# Patient Record
Sex: MALE | Race: WHITE | Employment: OTHER | URBAN - METROPOLITAN AREA
[De-identification: names, ages, dates, MRNs, and addresses within clinical notes are randomized per-mention and may not be internally consistent; named-entity substitution may affect disease eponyms.]

---

## 2018-05-18 ENCOUNTER — OFFICE VISIT CONVERTED (OUTPATIENT)
Dept: FAMILY MEDICINE CLINIC | Facility: CLINIC | Age: 63
End: 2018-05-18
Attending: NURSE PRACTITIONER

## 2018-11-03 ENCOUNTER — OFFICE VISIT CONVERTED (OUTPATIENT)
Dept: FAMILY MEDICINE CLINIC | Facility: CLINIC | Age: 63
End: 2018-11-03
Attending: FAMILY MEDICINE

## 2019-01-22 ENCOUNTER — OFFICE VISIT CONVERTED (OUTPATIENT)
Dept: FAMILY MEDICINE CLINIC | Facility: CLINIC | Age: 64
End: 2019-01-22
Attending: FAMILY MEDICINE

## 2019-02-18 ENCOUNTER — OFFICE (OUTPATIENT)
Dept: URBAN - METROPOLITAN AREA CLINIC 42 | Facility: CLINIC | Age: 64
End: 2019-02-18

## 2019-02-18 VITALS
HEIGHT: 68 IN | SYSTOLIC BLOOD PRESSURE: 97 MMHG | WEIGHT: 164 LBS | DIASTOLIC BLOOD PRESSURE: 65 MMHG | HEART RATE: 83 BPM

## 2019-02-18 DIAGNOSIS — R12 HEARTBURN: ICD-10-CM

## 2019-02-18 DIAGNOSIS — R13.10 DYSPHAGIA, UNSPECIFIED: ICD-10-CM

## 2019-02-18 DIAGNOSIS — Z12.11 ENCOUNTER FOR SCREENING FOR MALIGNANT NEOPLASM OF COLON: ICD-10-CM

## 2019-02-18 PROCEDURE — 99204 OFFICE O/P NEW MOD 45 MIN: CPT

## 2019-03-05 ENCOUNTER — AMBULATORY SURGICAL CENTER (OUTPATIENT)
Dept: URBAN - METROPOLITAN AREA SURGERY 20 | Facility: SURGERY | Age: 64
End: 2019-03-05
Payer: COMMERCIAL

## 2019-03-05 ENCOUNTER — OFFICE (OUTPATIENT)
Dept: URBAN - METROPOLITAN AREA PATHOLOGY 4 | Facility: PATHOLOGY | Age: 64
End: 2019-03-05

## 2019-03-05 VITALS — HEIGHT: 68 IN

## 2019-03-05 DIAGNOSIS — D12.3 BENIGN NEOPLASM OF TRANSVERSE COLON: ICD-10-CM

## 2019-03-05 DIAGNOSIS — D12.0 BENIGN NEOPLASM OF CECUM: ICD-10-CM

## 2019-03-05 DIAGNOSIS — K31.89 OTHER DISEASES OF STOMACH AND DUODENUM: ICD-10-CM

## 2019-03-05 DIAGNOSIS — R12 HEARTBURN: ICD-10-CM

## 2019-03-05 DIAGNOSIS — K21.0 GASTRO-ESOPHAGEAL REFLUX DISEASE WITH ESOPHAGITIS: ICD-10-CM

## 2019-03-05 DIAGNOSIS — R13.10 DYSPHAGIA, UNSPECIFIED: ICD-10-CM

## 2019-03-05 DIAGNOSIS — Z12.11 ENCOUNTER FOR SCREENING FOR MALIGNANT NEOPLASM OF COLON: ICD-10-CM

## 2019-03-05 PROBLEM — K92.2 GASTROINTESTINAL HEMORRHAGE, UNSPECIFIED: Status: ACTIVE | Noted: 2019-03-05

## 2019-03-05 PROBLEM — K20.9 ESOPHAGITIS, UNSPECIFIED: Status: ACTIVE | Noted: 2019-03-05

## 2019-03-05 PROBLEM — K31.7 POLYP OF STOMACH AND DUODENUM: Status: ACTIVE | Noted: 2019-03-05

## 2019-03-05 LAB
GI HISTOLOGY: A. SELECT: (no result)
GI HISTOLOGY: B. SELECT: (no result)
GI HISTOLOGY: C. SELECT: (no result)
GI HISTOLOGY: D. SELECT: (no result)
GI HISTOLOGY: PDF REPORT: (no result)

## 2019-03-05 PROCEDURE — 43251 EGD REMOVE LESION SNARE: CPT

## 2019-03-05 PROCEDURE — 45385 COLONOSCOPY W/LESION REMOVAL: CPT | Mod: PT

## 2019-03-05 PROCEDURE — 88305 TISSUE EXAM BY PATHOLOGIST: CPT

## 2019-03-05 PROCEDURE — 43239 EGD BIOPSY SINGLE/MULTIPLE: CPT | Mod: 59

## 2019-03-05 RX ORDER — OMEPRAZOLE 20 MG/1
40 CAPSULE, DELAYED RELEASE ORAL
Qty: 180 | Refills: 3 | Status: ACTIVE
Start: 2019-03-05

## 2019-10-26 ENCOUNTER — OFFICE VISIT CONVERTED (OUTPATIENT)
Dept: FAMILY MEDICINE CLINIC | Facility: CLINIC | Age: 64
End: 2019-10-26
Attending: NURSE PRACTITIONER

## 2019-12-10 ENCOUNTER — HOSPITAL ENCOUNTER (OUTPATIENT)
Dept: FAMILY MEDICINE CLINIC | Facility: CLINIC | Age: 64
Discharge: HOME | End: 2019-12-10
Attending: NURSE PRACTITIONER

## 2019-12-10 ENCOUNTER — OFFICE VISIT CONVERTED (OUTPATIENT)
Dept: FAMILY MEDICINE CLINIC | Facility: CLINIC | Age: 64
End: 2019-12-10
Attending: NURSE PRACTITIONER

## 2019-12-10 LAB
CHOLEST SERPL-MCNC: 205 MG/DL (ref 107–200)
CHOLEST/HDLC SERPL: 4.1 {RATIO} (ref 3–6)
HDLC SERPL-MCNC: 50 MG/DL (ref 40–60)
LDLC SERPL CALC-MCNC: 137 MG/DL (ref 70–100)
PSA SERPL-MCNC: 3.68 NG/ML (ref 0–4)
TRIGL SERPL-MCNC: 92 MG/DL (ref 40–150)
VLDLC SERPL-MCNC: 18 MG/DL (ref 5–37)

## 2019-12-11 LAB — HCV AB S/CO SERPL IA: <0.1 S/CO RATIO (ref 0–0.9)

## 2019-12-13 LAB — CONV HIV COMBO AG/AB (HIV-1/O/2) WITH REFLEX: NEGATIVE

## 2019-12-31 ENCOUNTER — HOSPITAL ENCOUNTER (OUTPATIENT)
Dept: OTHER | Facility: HOSPITAL | Age: 64
Discharge: HOME | End: 2019-12-31
Attending: NURSE PRACTITIONER

## 2020-03-16 ENCOUNTER — HOSPITAL ENCOUNTER (OUTPATIENT)
Dept: FAMILY MEDICINE CLINIC | Facility: CLINIC | Age: 65
Discharge: HOME | End: 2020-03-16
Attending: FAMILY MEDICINE

## 2020-03-16 ENCOUNTER — HOSPITAL ENCOUNTER (OUTPATIENT)
Dept: CARDIOLOGY | Facility: HOSPITAL | Age: 65
Discharge: HOME | End: 2020-03-16
Attending: FAMILY MEDICINE

## 2020-03-16 ENCOUNTER — OFFICE VISIT CONVERTED (OUTPATIENT)
Dept: FAMILY MEDICINE CLINIC | Facility: CLINIC | Age: 65
End: 2020-03-16
Attending: FAMILY MEDICINE

## 2020-05-18 ENCOUNTER — HOSPITAL ENCOUNTER (OUTPATIENT)
Dept: FAMILY MEDICINE CLINIC | Facility: CLINIC | Age: 65
Discharge: HOME | End: 2020-05-18
Attending: NURSE PRACTITIONER

## 2020-05-18 ENCOUNTER — OFFICE VISIT CONVERTED (OUTPATIENT)
Dept: FAMILY MEDICINE CLINIC | Facility: CLINIC | Age: 65
End: 2020-05-18
Attending: NURSE PRACTITIONER

## 2020-05-21 LAB
ASO AB SERPL-ACNC: 66 [IU]/ML (ref 0–200)
CONV RHEUMATOID FACTOR IGM: <10 [IU]/ML (ref 0–14)
CRP SERPL-MCNC: 2 MG/L (ref 0–5)
DSDNA AB SER-ACNC: NEGATIVE [IU]/ML
ENA AB SER IA-ACNC: NEGATIVE {RATIO}
URATE SERPL-MCNC: 6.6 MG/DL (ref 3.5–8.5)

## 2020-09-17 ENCOUNTER — OFFICE VISIT CONVERTED (OUTPATIENT)
Dept: FAMILY MEDICINE CLINIC | Facility: CLINIC | Age: 65
End: 2020-09-17
Attending: FAMILY MEDICINE

## 2020-09-17 ENCOUNTER — CONVERSION ENCOUNTER (OUTPATIENT)
Dept: FAMILY MEDICINE CLINIC | Facility: CLINIC | Age: 65
End: 2020-09-17

## 2020-09-17 ENCOUNTER — HOSPITAL ENCOUNTER (OUTPATIENT)
Dept: FAMILY MEDICINE CLINIC | Facility: CLINIC | Age: 65
Discharge: HOME | End: 2020-09-17
Attending: FAMILY MEDICINE

## 2020-09-19 LAB
BACTERIA SPEC AEROBE CULT: NORMAL
SARS-COV-2 RNA SPEC QL NAA+PROBE: NOT DETECTED

## 2020-09-25 ENCOUNTER — OFFICE VISIT CONVERTED (OUTPATIENT)
Dept: FAMILY MEDICINE CLINIC | Facility: CLINIC | Age: 65
End: 2020-09-25
Attending: FAMILY MEDICINE

## 2021-05-09 VITALS
DIASTOLIC BLOOD PRESSURE: 66 MMHG | TEMPERATURE: 97.8 F | HEIGHT: 67 IN | WEIGHT: 158.25 LBS | SYSTOLIC BLOOD PRESSURE: 122 MMHG | BODY MASS INDEX: 24.84 KG/M2 | OXYGEN SATURATION: 95 % | HEART RATE: 70 BPM

## 2021-05-09 VITALS
HEART RATE: 65 BPM | TEMPERATURE: 97 F | WEIGHT: 159 LBS | SYSTOLIC BLOOD PRESSURE: 120 MMHG | DIASTOLIC BLOOD PRESSURE: 84 MMHG | OXYGEN SATURATION: 97 % | HEIGHT: 67 IN | BODY MASS INDEX: 24.96 KG/M2

## 2021-05-09 VITALS — HEART RATE: 95 BPM | TEMPERATURE: 98.2 F | OXYGEN SATURATION: 96 %

## 2021-05-09 VITALS
HEART RATE: 73 BPM | SYSTOLIC BLOOD PRESSURE: 90 MMHG | DIASTOLIC BLOOD PRESSURE: 62 MMHG | BODY MASS INDEX: 24.82 KG/M2 | HEIGHT: 67 IN | OXYGEN SATURATION: 97 % | TEMPERATURE: 98.3 F | WEIGHT: 158.12 LBS

## 2021-05-09 VITALS
SYSTOLIC BLOOD PRESSURE: 122 MMHG | HEART RATE: 73 BPM | WEIGHT: 162 LBS | TEMPERATURE: 97.4 F | OXYGEN SATURATION: 98 % | DIASTOLIC BLOOD PRESSURE: 74 MMHG

## 2021-05-09 VITALS — TEMPERATURE: 98.2 F | OXYGEN SATURATION: 94 % | HEART RATE: 62 BPM

## 2021-05-09 VITALS
SYSTOLIC BLOOD PRESSURE: 122 MMHG | OXYGEN SATURATION: 96 % | HEART RATE: 88 BPM | TEMPERATURE: 98.3 F | DIASTOLIC BLOOD PRESSURE: 70 MMHG | HEIGHT: 67 IN | BODY MASS INDEX: 24.84 KG/M2 | WEIGHT: 158.25 LBS

## 2021-05-09 VITALS
HEIGHT: 67 IN | TEMPERATURE: 97.4 F | SYSTOLIC BLOOD PRESSURE: 110 MMHG | WEIGHT: 155 LBS | DIASTOLIC BLOOD PRESSURE: 70 MMHG | BODY MASS INDEX: 24.33 KG/M2 | HEART RATE: 83 BPM | OXYGEN SATURATION: 93 %

## 2021-05-09 VITALS
HEART RATE: 97 BPM | SYSTOLIC BLOOD PRESSURE: 120 MMHG | BODY MASS INDEX: 26.2 KG/M2 | DIASTOLIC BLOOD PRESSURE: 80 MMHG | HEIGHT: 66 IN | OXYGEN SATURATION: 93 % | TEMPERATURE: 97.6 F | WEIGHT: 163 LBS

## 2021-06-15 ENCOUNTER — TELEPHONE (OUTPATIENT)
Dept: FAMILY MEDICINE CLINIC | Facility: CLINIC | Age: 66
End: 2021-06-15

## 2021-06-15 DIAGNOSIS — N40.1 BPH ASSOCIATED WITH NOCTURIA: Primary | ICD-10-CM

## 2021-06-15 DIAGNOSIS — R35.1 BPH ASSOCIATED WITH NOCTURIA: Primary | ICD-10-CM

## 2021-06-15 RX ORDER — TAMSULOSIN HYDROCHLORIDE 0.4 MG/1
1 CAPSULE ORAL DAILY
Qty: 30 CAPSULE | Refills: 0 | Status: SHIPPED | OUTPATIENT
Start: 2021-06-15 | End: 2021-07-19

## 2021-06-15 NOTE — TELEPHONE ENCOUNTER
Caller: RiceGuillaume    Relationship: Self    Best call back number: [phone]    Medication needed:   Requested Prescriptions      No prescriptions requested or ordered in this encounter      TAMSULOSIN 0.4 MG     When do you need the refill by: 06/15/2021    What additional details did the patient provide when requesting the medication: PATIENT WOULD LIKE A 90 DAY SUPPLY.    Does the patient have less than a 3 day supply:  [x] Yes  [] No    What is the patient's preferred pharmacy: [address] [phone] [facility] [phone]

## 2021-06-25 ENCOUNTER — OFFICE VISIT (OUTPATIENT)
Dept: FAMILY MEDICINE CLINIC | Facility: CLINIC | Age: 66
End: 2021-06-25

## 2021-06-25 VITALS
SYSTOLIC BLOOD PRESSURE: 110 MMHG | HEART RATE: 54 BPM | BODY MASS INDEX: 25.3 KG/M2 | HEIGHT: 67 IN | OXYGEN SATURATION: 94 % | DIASTOLIC BLOOD PRESSURE: 52 MMHG | TEMPERATURE: 97.5 F | WEIGHT: 161.2 LBS

## 2021-06-25 DIAGNOSIS — E78.2 MIXED HYPERLIPIDEMIA: Primary | ICD-10-CM

## 2021-06-25 DIAGNOSIS — Z12.5 SCREENING PSA (PROSTATE SPECIFIC ANTIGEN): ICD-10-CM

## 2021-06-25 PROBLEM — I10 HYPERTENSION: Status: ACTIVE | Noted: 2021-06-25

## 2021-06-25 PROBLEM — G47.30 SLEEP APNEA: Status: ACTIVE | Noted: 2021-06-25

## 2021-06-25 PROBLEM — N40.0 BPH (BENIGN PROSTATIC HYPERPLASIA): Status: ACTIVE | Noted: 2021-06-25

## 2021-06-25 PROBLEM — E78.5 HYPERLIPIDEMIA: Status: ACTIVE | Noted: 2021-06-25

## 2021-06-25 PROBLEM — H26.9 CATARACT: Status: ACTIVE | Noted: 2021-06-25

## 2021-06-25 LAB
ALBUMIN SERPL-MCNC: 4.3 G/DL (ref 3.5–5.2)
ALBUMIN/GLOB SERPL: 1.6 G/DL
ALP SERPL-CCNC: 57 U/L (ref 39–117)
ALT SERPL W P-5'-P-CCNC: 26 U/L (ref 1–41)
ANION GAP SERPL CALCULATED.3IONS-SCNC: 8.6 MMOL/L (ref 5–15)
AST SERPL-CCNC: 18 U/L (ref 1–40)
BASOPHILS # BLD AUTO: 0.11 10*3/MM3 (ref 0–0.2)
BASOPHILS NFR BLD AUTO: 1.4 % (ref 0–1.5)
BILIRUB SERPL-MCNC: 0.3 MG/DL (ref 0–1.2)
BUN SERPL-MCNC: 17 MG/DL (ref 8–23)
BUN/CREAT SERPL: 15 (ref 7–25)
CALCIUM SPEC-SCNC: 9.4 MG/DL (ref 8.6–10.5)
CHLORIDE SERPL-SCNC: 104 MMOL/L (ref 98–107)
CHOLEST SERPL-MCNC: 186 MG/DL (ref 0–200)
CO2 SERPL-SCNC: 24.4 MMOL/L (ref 22–29)
CREAT SERPL-MCNC: 1.13 MG/DL (ref 0.76–1.27)
DEPRECATED RDW RBC AUTO: 46.2 FL (ref 37–54)
EOSINOPHIL # BLD AUTO: 0.28 10*3/MM3 (ref 0–0.4)
EOSINOPHIL NFR BLD AUTO: 3.5 % (ref 0.3–6.2)
ERYTHROCYTE [DISTWIDTH] IN BLOOD BY AUTOMATED COUNT: 13.8 % (ref 12.3–15.4)
GFR SERPL CREATININE-BSD FRML MDRD: 65 ML/MIN/1.73
GLOBULIN UR ELPH-MCNC: 2.7 GM/DL
GLUCOSE SERPL-MCNC: 88 MG/DL (ref 65–99)
HCT VFR BLD AUTO: 49.7 % (ref 37.5–51)
HDLC SERPL-MCNC: 51 MG/DL (ref 40–60)
HGB BLD-MCNC: 16.5 G/DL (ref 13–17.7)
IMM GRANULOCYTES # BLD AUTO: 0.03 10*3/MM3 (ref 0–0.05)
IMM GRANULOCYTES NFR BLD AUTO: 0.4 % (ref 0–0.5)
LDLC SERPL CALC-MCNC: 121 MG/DL (ref 0–100)
LDLC/HDLC SERPL: 2.35 {RATIO}
LYMPHOCYTES # BLD AUTO: 2.86 10*3/MM3 (ref 0.7–3.1)
LYMPHOCYTES NFR BLD AUTO: 35.5 % (ref 19.6–45.3)
MCH RBC QN AUTO: 30.6 PG (ref 26.6–33)
MCHC RBC AUTO-ENTMCNC: 33.2 G/DL (ref 31.5–35.7)
MCV RBC AUTO: 92.2 FL (ref 79–97)
MONOCYTES # BLD AUTO: 0.46 10*3/MM3 (ref 0.1–0.9)
MONOCYTES NFR BLD AUTO: 5.7 % (ref 5–12)
NEUTROPHILS NFR BLD AUTO: 4.31 10*3/MM3 (ref 1.7–7)
NEUTROPHILS NFR BLD AUTO: 53.5 % (ref 42.7–76)
NRBC BLD AUTO-RTO: 0 /100 WBC (ref 0–0.2)
PLATELET # BLD AUTO: 276 10*3/MM3 (ref 140–450)
PMV BLD AUTO: 11.5 FL (ref 6–12)
POTASSIUM SERPL-SCNC: 4.7 MMOL/L (ref 3.5–5.2)
PROT SERPL-MCNC: 7 G/DL (ref 6–8.5)
PSA SERPL-MCNC: 3.17 NG/ML (ref 0–4)
RBC # BLD AUTO: 5.39 10*6/MM3 (ref 4.14–5.8)
SODIUM SERPL-SCNC: 137 MMOL/L (ref 136–145)
TRIGL SERPL-MCNC: 75 MG/DL (ref 0–150)
VLDLC SERPL-MCNC: 14 MG/DL (ref 5–40)
WBC # BLD AUTO: 8.05 10*3/MM3 (ref 3.4–10.8)

## 2021-06-25 PROCEDURE — 85025 COMPLETE CBC W/AUTO DIFF WBC: CPT | Performed by: FAMILY MEDICINE

## 2021-06-25 PROCEDURE — G0439 PPPS, SUBSEQ VISIT: HCPCS | Performed by: FAMILY MEDICINE

## 2021-06-25 PROCEDURE — 80053 COMPREHEN METABOLIC PANEL: CPT | Performed by: FAMILY MEDICINE

## 2021-06-25 PROCEDURE — 1170F FXNL STATUS ASSESSED: CPT | Performed by: FAMILY MEDICINE

## 2021-06-25 PROCEDURE — 1159F MED LIST DOCD IN RCRD: CPT | Performed by: FAMILY MEDICINE

## 2021-06-25 PROCEDURE — G0103 PSA SCREENING: HCPCS | Performed by: FAMILY MEDICINE

## 2021-06-25 PROCEDURE — 80061 LIPID PANEL: CPT | Performed by: FAMILY MEDICINE

## 2021-06-25 PROCEDURE — 36415 COLL VENOUS BLD VENIPUNCTURE: CPT | Performed by: FAMILY MEDICINE

## 2021-06-25 NOTE — PROGRESS NOTES
The ABCs of the Annual Wellness Visit  Subsequent Medicare Wellness Visit    Chief Complaint   Patient presents with   • Medicare Wellness-subsequent       Subjective   History of Present Illness:  Guillaume Rice is a 66 y.o. male who presents for a Subsequent Medicare Wellness Visit.    HEALTH RISK ASSESSMENT    Recent Hospitalizations:  No hospitalization(s) within the last year.    Current Medical Providers:  Patient Care Team:  Foreign Nieves MD as PCP - General (Family Medicine)    Smoking Status:  Social History     Tobacco Use   Smoking Status Former Smoker   • Quit date: 2012   • Years since quittin.9   Smokeless Tobacco Never Used   Tobacco Comment    1 to 2 ppd        Alcohol Consumption:  Social History     Substance and Sexual Activity   Alcohol Use Yes    Comment: 12 pack beer weekly       Depression Screen:   PHQ-2/PHQ-9 Depression Screening 2021   Little interest or pleasure in doing things 0   Feeling down, depressed, or hopeless 0   Total Score 0       Fall Risk Screen:  CHRIS Fall Risk Assessment was completed, and patient is at LOW risk for falls.Assessment completed on:2021    Health Habits and Functional and Cognitive Screening:  Functional & Cognitive Status 2021   Do you have difficulty preparing food and eating? -   Do you have difficulty bathing yourself, getting dressed or grooming yourself? -   Do you have difficulty using the toilet? No   Do you have difficulty moving around from place to place? No   Do you have trouble with steps or getting out of a bed or a chair? No   Current Diet Limited Junk Food   Dental Exam Up to date   Eye Exam Up to date   Exercise (times per week) 7 times per week   Current Exercises Include Walking   Do you need help using the phone?  No   Are you deaf or do you have serious difficulty hearing?  No   Do you need help with transportation? No   Do you need help shopping? No   Do you need help preparing meals?  No   Do you need  help with housework?  No   Do you need help with laundry? No   Do you need help taking your medications? No   Do you need help managing money? No   Do you ever drive or ride in a car without wearing a seat belt? No   Have you felt unusual stress, anger or loneliness in the last month? No   Who do you live with? Spouse   If you need help, do you have trouble finding someone available to you? No   Have you been bothered in the last four weeks by sexual problems? No   Do you have difficulty concentrating, remembering or making decisions? Yes         Does the patient have evidence of cognitive impairment? No    Asprin use counseling:Start ASA 81 mg daily     Age-appropriate Screening Schedule:  Refer to the list below for future screening recommendations based on patient's age, sex and/or medical conditions. Orders for these recommended tests are listed in the plan section. The patient has been provided with a written plan.    Health Maintenance   Topic Date Due   • TDAP/TD VACCINES (1 - Tdap) Never done   • ZOSTER VACCINE (1 of 2) Never done   • LIPID PANEL  06/16/2021   • INFLUENZA VACCINE  08/01/2021          The following portions of the patient's history were reviewed and updated as appropriate: allergies, current medications, past family history, past medical history, past social history, past surgical history and problem list.    Outpatient Medications Prior to Visit   Medication Sig Dispense Refill   • tamsulosin (FLOMAX) 0.4 MG capsule 24 hr capsule Take 1 capsule by mouth Daily. 30 capsule 0     No facility-administered medications prior to visit.       Patient Active Problem List   Diagnosis   • BPH (benign prostatic hyperplasia)   • Cataract   • Hyperlipidemia   • Hypertension   • Sleep apnea       Advanced Care Planning:  ACP discussion was held with the patient during this visit. Patient does not have an advance directive, information provided.    Review of Systems   Constitutional: Negative for activity  "change, appetite change, chills and fever.   Respiratory: Negative for cough, chest tightness and shortness of breath.    Cardiovascular: Negative for chest pain, palpitations and leg swelling.   Gastrointestinal: Negative for diarrhea, nausea and vomiting.   Musculoskeletal: Negative for arthralgias.   Skin: Negative for rash.   Neurological: Negative for dizziness.   Psychiatric/Behavioral: Negative for agitation, confusion, dysphoric mood, sleep disturbance and suicidal ideas. The patient is not nervous/anxious.        Compared to one year ago, the patient feels his physical health is the same.  Compared to one year ago, the patient feels his mental health is the same.    Reviewed chart for potential of high risk medication in the elderly: yes  Reviewed chart for potential of harmful drug interactions in the elderly:yes    Objective         Vitals:    06/25/21 0925   BP: 110/52   Pulse: 54   Temp: 97.5 °F (36.4 °C)   SpO2: 94%   Weight: 73.1 kg (161 lb 3.2 oz)   Height: 170.8 cm (67.25\")       Body mass index is 25.06 kg/m².  Discussed the patient's BMI with him. The BMI is in the acceptable range.    Physical Exam  Vitals reviewed.   Constitutional:       Appearance: Normal appearance. He is well-developed.   HENT:      Head: Normocephalic and atraumatic.      Right Ear: External ear normal.      Left Ear: External ear normal.      Mouth/Throat:      Pharynx: No oropharyngeal exudate.   Eyes:      Conjunctiva/sclera: Conjunctivae normal.      Pupils: Pupils are equal, round, and reactive to light.   Cardiovascular:      Rate and Rhythm: Normal rate and regular rhythm.      Pulses: Normal pulses.      Heart sounds: Normal heart sounds. No murmur heard.   No friction rub. No gallop.    Pulmonary:      Effort: Pulmonary effort is normal.      Breath sounds: Normal breath sounds. No wheezing or rhonchi.   Abdominal:      General: Bowel sounds are normal. There is no distension.      Palpations: Abdomen is soft.      " Tenderness: There is no abdominal tenderness.   Skin:     General: Skin is warm and dry.   Neurological:      Mental Status: He is alert and oriented to person, place, and time.      Cranial Nerves: No cranial nerve deficit.   Psychiatric:         Mood and Affect: Mood and affect normal.         Behavior: Behavior normal.         Thought Content: Thought content normal.         Judgment: Judgment normal.               Assessment/Plan   Medicare Risks and Personalized Health Plan  CMS Preventative Services Quick Reference  Advance Directive Discussion  Cardiovascular risk    The above risks/problems have been discussed with the patient.  Pertinent information has been shared with the patient in the After Visit Summary.  Follow up plans and orders are seen below in the Assessment/Plan Section.    Diagnoses and all orders for this visit:    1. Mixed hyperlipidemia (Primary)  -     CBC Auto Differential  -     Comprehensive Metabolic Panel  -     Lipid Panel    2. Screening PSA (prostate specific antigen)  -     PSA Screen      Follow Up:  Return in about 6 months (around 12/25/2021) for Recheck.     An After Visit Summary and PPPS were given to the patient.

## 2021-06-25 NOTE — PROGRESS NOTES
Venipuncture Blood Specimen Collection  Venipuncture performed in left arm by Carmen Johnson with good hemostasis. Patient tolerated the procedure well without complications.   06/25/21   Carmen Johnson

## 2021-06-28 NOTE — PROGRESS NOTES
Labs show no significant abnormalities except for elevated cholesterol that is better than last time.  Continue to watch diet and exercise.  Follow-up if you have any questions.

## 2021-07-12 DIAGNOSIS — N40.1 BPH ASSOCIATED WITH NOCTURIA: ICD-10-CM

## 2021-07-12 DIAGNOSIS — R35.1 BPH ASSOCIATED WITH NOCTURIA: ICD-10-CM

## 2021-07-19 RX ORDER — TAMSULOSIN HYDROCHLORIDE 0.4 MG/1
CAPSULE ORAL
Qty: 30 CAPSULE | Refills: 0 | Status: SHIPPED | OUTPATIENT
Start: 2021-07-19 | End: 2021-08-23

## 2021-08-21 DIAGNOSIS — N40.1 BPH ASSOCIATED WITH NOCTURIA: ICD-10-CM

## 2021-08-21 DIAGNOSIS — R35.1 BPH ASSOCIATED WITH NOCTURIA: ICD-10-CM

## 2021-08-23 RX ORDER — TAMSULOSIN HYDROCHLORIDE 0.4 MG/1
CAPSULE ORAL
Qty: 30 CAPSULE | Refills: 0 | Status: SHIPPED | OUTPATIENT
Start: 2021-08-23 | End: 2021-09-21

## 2021-09-21 DIAGNOSIS — R35.1 BPH ASSOCIATED WITH NOCTURIA: ICD-10-CM

## 2021-09-21 DIAGNOSIS — N40.1 BPH ASSOCIATED WITH NOCTURIA: ICD-10-CM

## 2021-09-21 RX ORDER — TAMSULOSIN HYDROCHLORIDE 0.4 MG/1
CAPSULE ORAL
Qty: 30 CAPSULE | Refills: 0 | Status: SHIPPED | OUTPATIENT
Start: 2021-09-21 | End: 2021-10-21

## 2021-10-20 DIAGNOSIS — N40.1 BPH ASSOCIATED WITH NOCTURIA: ICD-10-CM

## 2021-10-20 DIAGNOSIS — R35.1 BPH ASSOCIATED WITH NOCTURIA: ICD-10-CM

## 2021-10-21 RX ORDER — TAMSULOSIN HYDROCHLORIDE 0.4 MG/1
CAPSULE ORAL
Qty: 30 CAPSULE | Refills: 0 | Status: SHIPPED | OUTPATIENT
Start: 2021-10-21 | End: 2021-11-19

## 2021-11-19 DIAGNOSIS — R35.1 BPH ASSOCIATED WITH NOCTURIA: ICD-10-CM

## 2021-11-19 DIAGNOSIS — N40.1 BPH ASSOCIATED WITH NOCTURIA: ICD-10-CM

## 2021-11-19 RX ORDER — TAMSULOSIN HYDROCHLORIDE 0.4 MG/1
CAPSULE ORAL
Qty: 30 CAPSULE | Refills: 0 | Status: SHIPPED | OUTPATIENT
Start: 2021-11-19 | End: 2021-12-30

## 2021-12-29 DIAGNOSIS — R35.1 BPH ASSOCIATED WITH NOCTURIA: ICD-10-CM

## 2021-12-29 DIAGNOSIS — N40.1 BPH ASSOCIATED WITH NOCTURIA: ICD-10-CM

## 2021-12-30 RX ORDER — TAMSULOSIN HYDROCHLORIDE 0.4 MG/1
CAPSULE ORAL
Qty: 30 CAPSULE | Refills: 0 | Status: SHIPPED | OUTPATIENT
Start: 2021-12-30 | End: 2022-01-28

## 2022-01-28 DIAGNOSIS — N40.1 BPH ASSOCIATED WITH NOCTURIA: ICD-10-CM

## 2022-01-28 DIAGNOSIS — R35.1 BPH ASSOCIATED WITH NOCTURIA: ICD-10-CM

## 2022-01-28 RX ORDER — TAMSULOSIN HYDROCHLORIDE 0.4 MG/1
CAPSULE ORAL
Qty: 30 CAPSULE | Refills: 0 | Status: SHIPPED | OUTPATIENT
Start: 2022-01-28 | End: 2022-02-28

## 2022-02-28 DIAGNOSIS — R35.1 BPH ASSOCIATED WITH NOCTURIA: ICD-10-CM

## 2022-02-28 DIAGNOSIS — N40.1 BPH ASSOCIATED WITH NOCTURIA: ICD-10-CM

## 2022-02-28 RX ORDER — TAMSULOSIN HYDROCHLORIDE 0.4 MG/1
CAPSULE ORAL
Qty: 30 CAPSULE | Refills: 0 | Status: SHIPPED | OUTPATIENT
Start: 2022-02-28 | End: 2022-03-29

## 2022-03-12 ENCOUNTER — OFFICE VISIT (OUTPATIENT)
Dept: FAMILY MEDICINE CLINIC | Facility: CLINIC | Age: 67
End: 2022-03-12

## 2022-03-12 VITALS
BODY MASS INDEX: 25.58 KG/M2 | TEMPERATURE: 97.1 F | OXYGEN SATURATION: 98 % | HEIGHT: 67 IN | HEART RATE: 58 BPM | DIASTOLIC BLOOD PRESSURE: 70 MMHG | WEIGHT: 163 LBS | SYSTOLIC BLOOD PRESSURE: 110 MMHG

## 2022-03-12 DIAGNOSIS — K21.9 GASTROESOPHAGEAL REFLUX DISEASE, UNSPECIFIED WHETHER ESOPHAGITIS PRESENT: Primary | ICD-10-CM

## 2022-03-12 PROCEDURE — 99213 OFFICE O/P EST LOW 20 MIN: CPT | Performed by: FAMILY MEDICINE

## 2022-03-12 RX ORDER — OMEPRAZOLE 40 MG/1
40 CAPSULE, DELAYED RELEASE ORAL
Qty: 60 CAPSULE | Refills: 1 | Status: SHIPPED | OUTPATIENT
Start: 2022-03-12 | End: 2022-06-27

## 2022-03-12 NOTE — PROGRESS NOTES
"Chief Complaint    Cough (Sinus congestion )    Subjective      Guillaume Rice presents to [facility]    History of Present Illness    1.) CHEST SXS : Hx of GERD. Patient reports being placed on a medication for GERD in the past - has not taken that medication for 1 year. Patient reports a burning sensation of his chest - worsens at night when supine with coughing.    Objective     Vital Signs:     /70   Pulse 58   Temp 97.1 °F (36.2 °C)   Ht 170.2 cm (67\")   Wt 73.9 kg (163 lb)   SpO2 98%   BMI 25.53 kg/m²       Physical Exam  Vitals reviewed.   Constitutional:       General: He is not in acute distress.     Appearance: Normal appearance. He is well-developed.   HENT:      Head: Normocephalic and atraumatic.      Right Ear: Hearing and external ear normal.      Left Ear: Hearing and external ear normal.      Nose: Nose normal.   Eyes:      General: Lids are normal.         Right eye: No discharge.         Left eye: No discharge.      Conjunctiva/sclera: Conjunctivae normal.   Pulmonary:      Effort: Pulmonary effort is normal.      Breath sounds: Normal breath sounds.   Abdominal:      General: There is no distension.   Musculoskeletal:         General: No swelling.      Cervical back: Neck supple.   Skin:     Coloration: Skin is not jaundiced.      Findings: No erythema.   Neurological:      Mental Status: He is alert. Mental status is at baseline.   Psychiatric:         Mood and Affect: Mood and affect normal.         Thought Content: Thought content normal.     Assessment and Plan     Diagnoses and all orders for this visit:    1. Gastroesophageal reflux disease, unspecified whether esophagitis present (Primary)  Comments:  1.) Start PPI as noted. Call ofc if no relief in 1 week. Advised to take scheduled x weeks, then PRN. F/u at that time if no relief.     Other orders  -     omeprazole (priLOSEC) 40 MG capsule; Take 1 capsule by mouth Every Morning Before " Breakfast for 60 days.  Dispense: 60 capsule; Refill: 1    Patient was given instructions and counseling regarding his condition or for health maintenance advice. Please see specific information pulled into the AVS if appropriate.

## 2022-03-14 ENCOUNTER — TELEPHONE (OUTPATIENT)
Dept: FAMILY MEDICINE CLINIC | Facility: CLINIC | Age: 67
End: 2022-03-14

## 2022-03-14 NOTE — TELEPHONE ENCOUNTER
Caller: Guillaume Rice TIERRA    Relationship: Self    Best call back number: [phone]    What medication are you requesting: SINUS MEDICATION    What are your current symptoms: FACIAL SWELLING IN SINUSES, DRAINAGE AT NIGHT, CONGESTION    How long have you been experiencing symptoms: 3 DAYS    Have you had these symptoms before:    [x] Yes  [] No    Have you been treated for these symptoms before:   [x] Yes  [] No    If a prescription is needed, what is your preferred pharmacy and phone number: [address] [phone] [facility] [phone]      Additional notes: PATIENT WAS SEEN ON 03.12.2022 FOR HEART BURN AND MINOR SINUS ISSUES. HOWEVER SINCE THAT APPOINTMENT HIS SINUS ISSUES HAVE WORSENED. PATIENT'S HEART BURN FEELS MUCH BETTER BUT HE CANNOT SLEEP DUE TO THE DRAINAGE. PATIENT STATED HE TAKES SOME OVER THE COUNTER MEDICATION BUT IT ONLY LASTS AN HOUR AND IT COMES BACK.

## 2022-03-14 NOTE — TELEPHONE ENCOUNTER
Caller: Guillaume Rice    Relationship: Self    Best call back number: [phone]    What medication are you requesting: ANTIBIOTIC TO HELP WITH SINUS, DRAINAGE, SWELLING IN SINUS POCKETS    What are your current symptoms: CONGESTION    How long have you been experiencing symptoms: 3 DAYS    Have you had these symptoms before:    [x] Yes  [] No    Have you been treated for these symptoms before:   [x] Yes  [] No    If a prescription is needed, what is your preferred pharmacy and phone number: [address] [phone] [facility] [phone]      Additional notes:  PATIENT STATES THAT PHARMACY HAS NOT RECEIVED ANYTHING YET. HE IS WANTING SOMETHING TO HELP SINCE HIS SYMPTOMS HAS WORSENED SINCE 03/12/2022.

## 2022-03-15 RX ORDER — AMOXICILLIN AND CLAVULANATE POTASSIUM 875; 125 MG/1; MG/1
1 TABLET, FILM COATED ORAL 2 TIMES DAILY
Qty: 10 TABLET | Refills: 0 | Status: SHIPPED | OUTPATIENT
Start: 2022-03-15 | End: 2022-03-15

## 2022-03-15 RX ORDER — AMOXICILLIN AND CLAVULANATE POTASSIUM 875; 125 MG/1; MG/1
TABLET, FILM COATED ORAL
Qty: 10 TABLET | Refills: 0 | Status: SHIPPED | OUTPATIENT
Start: 2022-03-15 | End: 2022-03-18

## 2022-03-18 RX ORDER — AZITHROMYCIN 250 MG/1
TABLET, FILM COATED ORAL
Qty: 6 TABLET | Refills: 0 | Status: SHIPPED | OUTPATIENT
Start: 2022-03-18 | End: 2022-05-11

## 2022-03-29 DIAGNOSIS — R35.1 BPH ASSOCIATED WITH NOCTURIA: ICD-10-CM

## 2022-03-29 DIAGNOSIS — N40.1 BPH ASSOCIATED WITH NOCTURIA: ICD-10-CM

## 2022-03-29 RX ORDER — TAMSULOSIN HYDROCHLORIDE 0.4 MG/1
CAPSULE ORAL
Qty: 30 CAPSULE | Refills: 0 | Status: SHIPPED | OUTPATIENT
Start: 2022-03-29 | End: 2022-05-02

## 2022-05-02 DIAGNOSIS — N40.1 BPH ASSOCIATED WITH NOCTURIA: ICD-10-CM

## 2022-05-02 DIAGNOSIS — R35.1 BPH ASSOCIATED WITH NOCTURIA: ICD-10-CM

## 2022-05-02 RX ORDER — TAMSULOSIN HYDROCHLORIDE 0.4 MG/1
CAPSULE ORAL
Qty: 30 CAPSULE | Refills: 0 | Status: SHIPPED | OUTPATIENT
Start: 2022-05-02 | End: 2022-05-31

## 2022-05-11 ENCOUNTER — OFFICE VISIT (OUTPATIENT)
Dept: FAMILY MEDICINE CLINIC | Facility: CLINIC | Age: 67
End: 2022-05-11

## 2022-05-11 VITALS
BODY MASS INDEX: 24.75 KG/M2 | HEART RATE: 74 BPM | DIASTOLIC BLOOD PRESSURE: 80 MMHG | SYSTOLIC BLOOD PRESSURE: 130 MMHG | WEIGHT: 158 LBS | OXYGEN SATURATION: 98 % | TEMPERATURE: 97.4 F

## 2022-05-11 DIAGNOSIS — M54.41 ACUTE RIGHT-SIDED LOW BACK PAIN WITH RIGHT-SIDED SCIATICA: Primary | ICD-10-CM

## 2022-05-11 DIAGNOSIS — M51.37 DDD (DEGENERATIVE DISC DISEASE), LUMBOSACRAL: ICD-10-CM

## 2022-05-11 PROCEDURE — 99213 OFFICE O/P EST LOW 20 MIN: CPT | Performed by: NURSE PRACTITIONER

## 2022-05-11 RX ORDER — TIZANIDINE 4 MG/1
4 TABLET ORAL EVERY 8 HOURS PRN
Qty: 30 TABLET | Refills: 0 | Status: SHIPPED | OUTPATIENT
Start: 2022-05-11 | End: 2023-04-05

## 2022-05-11 RX ORDER — METHYLPREDNISOLONE 4 MG/1
TABLET ORAL
Qty: 1 EACH | Refills: 0 | Status: SHIPPED | OUTPATIENT
Start: 2022-05-11 | End: 2022-11-26

## 2022-05-11 RX ORDER — NAPROXEN 500 MG/1
500 TABLET ORAL 2 TIMES DAILY WITH MEALS
Qty: 60 TABLET | Refills: 0 | Status: SHIPPED | OUTPATIENT
Start: 2022-05-11 | End: 2023-04-05

## 2022-05-11 NOTE — PROGRESS NOTES
Chief Complaint  Back Pain (RT leg pain )    Subjective            Guillaume Rice presents to [facility]  History of Present Illness     He states two weeks ago he was changing the blades on his mower - and he had to place his leg on the mower for leverage when pulling, and he suddenly felt a pain and a pop in his back. He wasn't really having any pain during the day, initially, just during the night. The other day he went to his mailbox and turned to check on his dog, and then all of a sudden he felt a pain on the right side of his back, right around his belt line, and then the pain shot down his right leg.     He states that heat seemingly does help - he has been sitting in his recliner. He has been taking IBU OTC every 6 hours or so and it helps a little bit, but it never goes away.    This happened once before about 6 months ago - it got better, but this has not gotten any better.     PHQ-2 Total Score: 0      Past Medical History:   Diagnosis Date   • BPH (benign prostatic hyperplasia)    • Cataract    • Hyperlipidemia    • Hypertension    • Sleep apnea        No Known Allergies     History reviewed. No pertinent surgical history.     Social History     Tobacco Use   • Smoking status: Former Smoker     Quit date: 2012     Years since quittin.8   • Smokeless tobacco: Never Used   • Tobacco comment: 1 to 2 ppd    Vaping Use   • Vaping Use: Never used   Substance Use Topics   • Alcohol use: Yes     Comment: 12 pack beer weekly   • Drug use: Never       Family History   Problem Relation Age of Onset   • Hypertension Mother    • Hyperlipidemia Mother    • Hypertension Father    • Hyperlipidemia Father         Health Maintenance Due   Topic Date Due   • TDAP/TD VACCINES (1 - Tdap) Never done   • ZOSTER VACCINE (1 of 2) Never done   • Pneumococcal Vaccine 65+ (2 - PPSV23 or PCV20) 2018        Current Outpatient Medications on File Prior to Visit   Medication Sig   •  omeprazole (priLOSEC) 40 MG capsule Take 1 capsule by mouth Every Morning Before Breakfast for 60 days.   • tamsulosin (FLOMAX) 0.4 MG capsule 24 hr capsule TAKE 1 CAPSULE BY MOUTH EVERY DAY   • [DISCONTINUED] azithromycin (Zithromax Z-Neil) 250 MG tablet Take 2 tablets by mouth on day 1, then 1 tablet daily on days 2-5     No current facility-administered medications on file prior to visit.       Immunization History   Administered Date(s) Administered   • COVID-19 (MODERNA) 1st, 2nd, 3rd Dose Only 03/10/2021, 04/07/2021, 10/25/2021   • Fluzone High Dose =>65 Years ([employer] ONLY) 12/10/2019   • Influenza Quad Vaccine (Inpatient) 01/23/2017   • Pneumococcal Conjugate 13-Valent (PCV13) 01/23/2017, 01/23/2017       Review of Systems     Objective     /80   Pulse 74   Temp 97.4 °F (36.3 °C)   Wt 71.7 kg (158 lb)   SpO2 98%   BMI 24.75 kg/m²       Physical Exam  Vitals reviewed.   Constitutional:       General: He is not in acute distress.     Appearance: Normal appearance. He is well-developed and normal weight.   HENT:      Head: Normocephalic and atraumatic.   Eyes:      General: No scleral icterus.     Extraocular Movements: Extraocular movements intact.      Conjunctiva/sclera: Conjunctivae normal.   Cardiovascular:      Rate and Rhythm: Normal rate and regular rhythm.      Pulses: Normal pulses.      Heart sounds: No murmur heard.  Pulmonary:      Effort: Pulmonary effort is normal. No respiratory distress.      Breath sounds: Normal breath sounds. No wheezing, rhonchi or rales.   Musculoskeletal:      Thoracic back: No swelling or deformity. Normal range of motion. No scoliosis.      Lumbar back: Tenderness (TTP to the right of the spine, and over the right posterior aspect of the iliac crest, but not over the GT ) present. No swelling, deformity or bony tenderness. Decreased range of motion. Positive right straight leg raise test. Negative left straight leg raise test. No scoliosis.      Right lower  leg: No edema.      Left lower leg: No edema.   Skin:     General: Skin is warm and dry.   Neurological:      Mental Status: He is alert and oriented to person, place, and time.   Psychiatric:         Mood and Affect: Mood and affect normal.         Behavior: Behavior normal.         Thought Content: Thought content normal.         Judgment: Judgment normal.         Result Review :     The following data was reviewed by: LUZ MARINA Leone on 05/11/2022:    CMP    CMP 6/25/21   Glucose 88   BUN 17   Creatinine 1.13   eGFR Non African Am 65   Sodium 137   Potassium 4.7   Chloride 104   Calcium 9.4   Albumin 4.30   Total Bilirubin 0.3   Alkaline Phosphatase 57   AST (SGOT) 18   ALT (SGPT) 26             Data reviewed: Radiologic studies :   XR Spine Lumbar 2 or 3 View (In Office) (05/11/2022 14:19)         Assessment and Plan      Diagnoses and all orders for this visit:    1. Acute right-sided low back pain with right-sided sciatica (Primary)  -     XR Spine Lumbar 2 or 3 View (In Office)  -     methylPREDNISolone (MEDROL) 4 MG dose pack; Take as directed on package instructions.  Dispense: 1 each; Refill: 0  -     tiZANidine (ZANAFLEX) 4 MG tablet; Take 1 tablet by mouth Every 8 (Eight) Hours As Needed for Muscle Spasms (hold for somnolence).  Dispense: 30 tablet; Refill: 0  -     naproxen (EC NAPROSYN) 500 MG EC tablet; Take 1 tablet by mouth 2 (Two) Times a Day With Meals.  Dispense: 60 tablet; Refill: 0    2. DDD (degenerative disc disease), lumbosacral  -     methylPREDNISolone (MEDROL) 4 MG dose pack; Take as directed on package instructions.  Dispense: 1 each; Refill: 0  -     tiZANidine (ZANAFLEX) 4 MG tablet; Take 1 tablet by mouth Every 8 (Eight) Hours As Needed for Muscle Spasms (hold for somnolence).  Dispense: 30 tablet; Refill: 0  -     naproxen (EC NAPROSYN) 500 MG EC tablet; Take 1 tablet by mouth 2 (Two) Times a Day With Meals.  Dispense: 60 tablet; Refill: 0            Follow Up     Return if  symptoms worsen or fail to improve in the next 1-2 weeks.     Going to give him a course of oral steroids.  I will prescribe naproxen twice daily in place of his ibuprofen.  I will also provide a muscle relaxer and have instructed him to take every 8 hours as needed, but home for somnolent/sleepiness.  He may cut this in half if needed.  If he is not having any improvement in symptoms over the next 1 to 2 weeks, or should his symptoms worsen, he will contact the office and we will attempt to order an MRI to further assess.    Patient was given instructions and counseling regarding his condition or for health maintenance advice. Please see specific information pulled into the AVS if appropriate.     Guillaume Rice  reports that he quit smoking about 9 years ago. He has never used smokeless tobacco.

## 2022-05-23 ENCOUNTER — HOSPITAL ENCOUNTER (OUTPATIENT)
Dept: OTHER | Facility: HOSPITAL | Age: 67
Discharge: HOME | End: 2022-05-23

## 2022-05-31 DIAGNOSIS — R35.1 BPH ASSOCIATED WITH NOCTURIA: ICD-10-CM

## 2022-05-31 DIAGNOSIS — N40.1 BPH ASSOCIATED WITH NOCTURIA: ICD-10-CM

## 2022-05-31 RX ORDER — TAMSULOSIN HYDROCHLORIDE 0.4 MG/1
CAPSULE ORAL
Qty: 30 CAPSULE | Refills: 0 | Status: SHIPPED | OUTPATIENT
Start: 2022-05-31

## 2022-06-01 ENCOUNTER — TELEPHONE (OUTPATIENT)
Dept: FAMILY MEDICINE CLINIC | Facility: CLINIC | Age: 67
End: 2022-06-01

## 2022-06-01 NOTE — TELEPHONE ENCOUNTER
Caller: [facility]    Relationship: Other    Best call back number: [phone]    Who are you requesting to speak with (clinical staff, provider,  specific staff member): CLINICAL    What was the call regarding: [facility] NURSE STATES THE PATIENT WAS ORDERED TO HAVE PHYSICAL THERAPY BUT THE PATIENT DECLINED TO ACCEPT THE THERAPY.    Do you require a callback: IF NECESSARY

## 2022-06-27 RX ORDER — OMEPRAZOLE 40 MG/1
40 CAPSULE, DELAYED RELEASE ORAL
Qty: 90 CAPSULE | Refills: 0 | Status: SHIPPED | OUTPATIENT
Start: 2022-06-27 | End: 2023-01-06

## 2022-11-26 ENCOUNTER — OFFICE VISIT (OUTPATIENT)
Dept: FAMILY MEDICINE CLINIC | Facility: CLINIC | Age: 67
End: 2022-11-26

## 2022-11-26 VITALS
DIASTOLIC BLOOD PRESSURE: 75 MMHG | WEIGHT: 166 LBS | HEART RATE: 74 BPM | SYSTOLIC BLOOD PRESSURE: 120 MMHG | TEMPERATURE: 98.1 F | BODY MASS INDEX: 26 KG/M2 | OXYGEN SATURATION: 99 %

## 2022-11-26 DIAGNOSIS — J06.9 UPPER RESPIRATORY INFECTION WITH COUGH AND CONGESTION: Primary | ICD-10-CM

## 2022-11-26 DIAGNOSIS — J18.9 PNEUMONITIS: ICD-10-CM

## 2022-11-26 LAB
EXPIRATION DATE: 8924
FLUAV AG NPH QL: NEGATIVE
FLUBV AG NPH QL: NEGATIVE
INTERNAL CONTROL: NORMAL
Lab: NORMAL

## 2022-11-26 PROCEDURE — 87804 INFLUENZA ASSAY W/OPTIC: CPT | Performed by: FAMILY MEDICINE

## 2022-11-26 PROCEDURE — 99213 OFFICE O/P EST LOW 20 MIN: CPT | Performed by: FAMILY MEDICINE

## 2022-11-26 RX ORDER — ASPIRIN 81 MG/1
TABLET, COATED ORAL
COMMUNITY
Start: 2022-11-06

## 2022-11-26 RX ORDER — ATORVASTATIN CALCIUM 40 MG/1
40 TABLET, FILM COATED ORAL DAILY
COMMUNITY
Start: 2022-11-12

## 2022-11-26 RX ORDER — CLOPIDOGREL BISULFATE 75 MG/1
75 TABLET ORAL DAILY
COMMUNITY
Start: 2022-11-06

## 2022-11-26 RX ORDER — PREDNISONE 20 MG/1
40 TABLET ORAL DAILY
Qty: 14 TABLET | Refills: 0 | Status: SHIPPED | OUTPATIENT
Start: 2022-11-26 | End: 2022-12-03

## 2022-11-26 RX ORDER — AMOXICILLIN AND CLAVULANATE POTASSIUM 500; 125 MG/1; MG/1
1 TABLET, FILM COATED ORAL 2 TIMES DAILY
Qty: 14 TABLET | Refills: 0 | Status: SHIPPED | OUTPATIENT
Start: 2022-11-26 | End: 2022-12-03

## 2022-11-26 NOTE — PROGRESS NOTES
Chief Complaint    URI (Fever, sore throat and body aches x three days ), Fever, and Sore Throat    Subjective      Guillaume Rice presents to [facility]    History of Present Illness    1.) ACUTE URI : Onset - several days ago. Per patient - (+) pharyngitis, myalgia and intermittent fevers. History of smoking - quit at age 57 after smoking since age 14.     Objective     Vital Signs:     /75   Pulse 74   Temp 98.1 °F (36.7 °C)   Wt 75.3 kg (166 lb)   SpO2 99%   BMI 26.00 kg/m²       Physical Exam  Vitals reviewed.   Constitutional:       General: He is not in acute distress.     Appearance: Normal appearance. He is well-developed.   HENT:      Head: Normocephalic and atraumatic.      Right Ear: Hearing and external ear normal. Tympanic membrane is not erythematous or bulging.      Left Ear: Hearing and external ear normal. Tympanic membrane is not erythematous or bulging.      Nose: Nose normal.      Mouth/Throat:      Pharynx: No oropharyngeal exudate.   Eyes:      General: Lids are normal.         Right eye: No discharge.         Left eye: No discharge.      Conjunctiva/sclera: Conjunctivae normal.   Pulmonary:      Effort: Pulmonary effort is normal.      Breath sounds: Decreased breath sounds present.   Abdominal:      General: There is no distension.   Musculoskeletal:         General: No swelling.      Cervical back: Neck supple.   Skin:     Coloration: Skin is not jaundiced.      Findings: No erythema.   Neurological:      Mental Status: He is alert. Mental status is at baseline.   Psychiatric:         Mood and Affect: Mood and affect normal.         Thought Content: Thought content normal.       Assessment and Plan     Diagnoses and all orders for this visit:    1. Upper respiratory infection with cough and congestion (Primary)  Comments:  Rapid influenza neg. Concerned for ? COPD exacerbation - see below. Adequate fluids and rest. NSAID/Acetaminophen  PRN.  Orders:  -     POCT Influenza A/B    2. Pneumonitis  Comments:  PO steroid. Empiric abx. Advised to use home COV testing q 48 hours.     Other orders  -     predniSONE (DELTASONE) 20 MG tablet; Take 2 tablets by mouth Daily for 7 days.  Dispense: 14 tablet; Refill: 0  -     amoxicillin-clavulanate (Augmentin) 500-125 MG per tablet; Take 1 tablet by mouth 2 (Two) Times a Day for 7 days.  Dispense: 14 tablet; Refill: 0    Follow Up     Return if symptoms worsen or fail to improve.    Patient was given instructions and counseling regarding his condition or for health maintenance advice. Please see specific information pulled into the AVS if appropriate.

## 2023-01-06 ENCOUNTER — OFFICE VISIT (OUTPATIENT)
Dept: FAMILY MEDICINE CLINIC | Facility: CLINIC | Age: 68
End: 2023-01-06
Payer: MEDICARE

## 2023-01-06 VITALS
HEART RATE: 66 BPM | DIASTOLIC BLOOD PRESSURE: 70 MMHG | OXYGEN SATURATION: 98 % | WEIGHT: 168.8 LBS | SYSTOLIC BLOOD PRESSURE: 124 MMHG | HEIGHT: 67 IN | TEMPERATURE: 97.3 F | BODY MASS INDEX: 26.49 KG/M2

## 2023-01-06 DIAGNOSIS — H92.01 RIGHT EAR PAIN: Primary | Chronic | ICD-10-CM

## 2023-01-06 DIAGNOSIS — K21.9 GASTROESOPHAGEAL REFLUX DISEASE, UNSPECIFIED WHETHER ESOPHAGITIS PRESENT: Chronic | ICD-10-CM

## 2023-01-06 PROBLEM — I25.2 HISTORY OF HEART ATTACK: Status: ACTIVE | Noted: 2023-01-06

## 2023-01-06 PROBLEM — G47.33 OSA (OBSTRUCTIVE SLEEP APNEA): Status: ACTIVE | Noted: 2021-06-25

## 2023-01-06 PROBLEM — I25.110 CORONARY ARTERY DISEASE INVOLVING NATIVE CORONARY ARTERY OF NATIVE HEART WITH UNSTABLE ANGINA PECTORIS: Status: ACTIVE | Noted: 2022-05-25

## 2023-01-06 PROBLEM — I50.9 CHF (CONGESTIVE HEART FAILURE): Status: ACTIVE | Noted: 2022-05-23

## 2023-01-06 PROBLEM — I48.0 PAF (PAROXYSMAL ATRIAL FIBRILLATION): Status: ACTIVE | Noted: 2022-05-26

## 2023-01-06 PROBLEM — I25.5 ISCHEMIC CARDIOMYOPATHY: Status: ACTIVE | Noted: 2022-05-25

## 2023-01-06 PROCEDURE — 99213 OFFICE O/P EST LOW 20 MIN: CPT | Performed by: FAMILY MEDICINE

## 2023-01-06 NOTE — PROGRESS NOTES
Venipuncture Blood Specimen Collection  Venipuncture performed in left arm by Carmen Leon with good hemostasis. Patient tolerated the procedure well without complications.   01/06/23   Carmen Leon

## 2023-01-06 NOTE — PROGRESS NOTES
Chief Complaint    Hyperlipidemia (Follow-up and med refills), Hypertension, and Earache (RT ear pain off and on since June 2022.  Has seen several MD's and no one can find anything wrong.  Has h/o melanoma in that earlobe.)    Subjective      Guillaume Rice presents to [facility]    History of Present Illness - poor historian    1.) GERD : Patient reports recently being advised per pharmacology to discontinue omeprazole secondary to interaction with his cardiac medication.  Currently using famotidine.  Unclear whether patient is experiencing significant symptomatic improvement.    2.) EAR PAIN : Laterality - right.  Patient reports a history of chronic problems with the ear.  He reports an intermittent pain.  He reports loss of hearing.  He notes that he has been evaluated by at least 2 specialists, who has been unable to determine the cause of his symptoms.  He reports that he feels as if his ear on the right is congested.    Objective     Vital Signs:     /70 (BP Location: Left arm, Patient Position: Sitting, Cuff Size: Adult)   Pulse 66   Temp 97.3 °F (36.3 °C) (Temporal)   Ht 170.2 cm (67\")   Wt 76.6 kg (168 lb 12.8 oz)   SpO2 98%   BMI 26.44 kg/m²       Physical Exam  Vitals reviewed.   Constitutional:       General: He is not in acute distress.     Appearance: Normal appearance. He is well-developed.   HENT:      Head: Normocephalic and atraumatic.      Right Ear: Hearing and external ear normal. A middle ear effusion is present. Tympanic membrane is not injected or bulging.      Left Ear: Hearing and external ear normal.      Nose: Nose normal.   Eyes:      General: Lids are normal.         Right eye: No discharge.         Left eye: No discharge.      Conjunctiva/sclera: Conjunctivae normal.   Pulmonary:      Effort: Pulmonary effort is normal.   Abdominal:      General: There is no distension.   Musculoskeletal:         General: No swelling.      Cervical back:  Neck supple.   Skin:     Coloration: Skin is not jaundiced.      Findings: No erythema.   Neurological:      Mental Status: He is alert. Mental status is at baseline.   Psychiatric:         Mood and Affect: Mood and affect normal.         Thought Content: Thought content normal.     Assessment and Plan     Diagnoses and all orders for this visit:    1. Right ear pain (Primary)  Comments:  Recommendation for referral to audiology-declined per patient.  Recommendation for Flonase-declined per patient.  Follow-up as needed.  Orders:  -     Cancel: Lipid Panel  -     Cancel: CBC & Differential    2. Gastroesophageal reflux disease, unspecified whether esophagitis present  Comments:  Continue famotidine.  Orders:  -     Cancel: CBC & Differential  -     Cancel: Comprehensive Metabolic Panel  -     Cancel: Comprehensive Metabolic Panel  -     Cancel: Lipid Panel    Other orders  -     Cancel: PSA SCREENING  -     Cancel: PSA SCREENING; Future    Follow Up     Return in about 6 months (around 7/6/2023).    Patient was given instructions and counseling regarding his condition or for health maintenance advice. Please see specific information pulled into the AVS if appropriate.

## 2023-02-15 ENCOUNTER — OFFICE VISIT (OUTPATIENT)
Dept: FAMILY MEDICINE CLINIC | Facility: CLINIC | Age: 68
End: 2023-02-15
Payer: MEDICARE

## 2023-02-15 VITALS
SYSTOLIC BLOOD PRESSURE: 110 MMHG | HEIGHT: 67 IN | TEMPERATURE: 98.2 F | OXYGEN SATURATION: 97 % | BODY MASS INDEX: 26.06 KG/M2 | WEIGHT: 166 LBS | DIASTOLIC BLOOD PRESSURE: 60 MMHG | HEART RATE: 77 BPM

## 2023-02-15 DIAGNOSIS — R07.81 PLEURITIC CHEST PAIN: Primary | ICD-10-CM

## 2023-02-15 PROCEDURE — 99213 OFFICE O/P EST LOW 20 MIN: CPT | Performed by: FAMILY MEDICINE

## 2023-02-15 NOTE — PROGRESS NOTES
"Chief Complaint    Pain With Breathing (Sore and painful in the right lung area, thinks he has pleurisy, started a few days ago )    Subjective      Guillaume Rice presents to [facility]    History of Present Illness    1.) RIGHT STERNAL PAIN : Onset -several days ago.  However, in the room, the patient admits to several years of similar symptoms.  Location-right lower anterior chest region.  Patient reports experiencing episodes of difficulty breathing.  He reports pain that 'kept me up last night.'  He denies any recent MSK related injuries.  No complaints of fevers or chills.  He does admit to at least 1 episode of what he describes as increased sputum production.  Former smoker.  Quit in 2012.  Smoked for 15 years.  Smoked 1 to 2 packs/day.    Objective     Vital Signs:     /60 (BP Location: Left arm)   Pulse 77   Temp 98.2 °F (36.8 °C)   Ht 170.2 cm (67\")   Wt 75.3 kg (166 lb)   SpO2 97%   BMI 26.00 kg/m²       Physical Exam  Vitals reviewed.   Constitutional:       General: He is not in acute distress.     Appearance: Normal appearance. He is well-developed.   HENT:      Head: Normocephalic and atraumatic.      Right Ear: Hearing and external ear normal.      Left Ear: Hearing and external ear normal.      Nose: Nose normal.   Eyes:      General: Lids are normal.         Right eye: No discharge.         Left eye: No discharge.      Conjunctiva/sclera: Conjunctivae normal.   Cardiovascular:      Rate and Rhythm: Normal rate and regular rhythm.   Pulmonary:      Effort: Pulmonary effort is normal. No respiratory distress.      Breath sounds: No stridor. No wheezing, rhonchi or rales.   Abdominal:      General: There is no distension.   Musculoskeletal:         General: No swelling.      Cervical back: Neck supple.   Skin:     Coloration: Skin is not jaundiced.      Findings: No erythema.   Neurological:      Mental Status: He is alert. Mental status is at baseline. "   Psychiatric:         Mood and Affect: Mood and affect normal.         Thought Content: Thought content normal.     Assessment and Plan     Diagnoses and all orders for this visit:    1. Pleuritic chest pain (Primary)  Comments:  X-ray unremarkable.  Will await official radiology report.  Likely MSK related.  Advised acetaminophen as needed.  Additional recs per review of rad report.-  Orders:  -     XR Chest PA & Lateral (In Office)    Patient was given instructions and counseling regarding his condition or for health maintenance advice. Please see specific information pulled into the AVS if appropriate.

## 2023-04-05 ENCOUNTER — OFFICE VISIT (OUTPATIENT)
Dept: FAMILY MEDICINE CLINIC | Facility: CLINIC | Age: 68
End: 2023-04-05
Payer: MEDICARE

## 2023-04-05 VITALS
HEIGHT: 67 IN | WEIGHT: 163.6 LBS | SYSTOLIC BLOOD PRESSURE: 126 MMHG | TEMPERATURE: 97.7 F | DIASTOLIC BLOOD PRESSURE: 68 MMHG | HEART RATE: 68 BPM | OXYGEN SATURATION: 96 % | BODY MASS INDEX: 25.68 KG/M2

## 2023-04-05 DIAGNOSIS — Z00.00 MEDICARE ANNUAL WELLNESS VISIT, SUBSEQUENT: Primary | ICD-10-CM

## 2023-04-05 DIAGNOSIS — R41.3 MEMORY CHANGE: ICD-10-CM

## 2023-04-05 DIAGNOSIS — Z71.89 ADVANCE DIRECTIVE DISCUSSED WITH PATIENT: ICD-10-CM

## 2023-04-05 PROBLEM — N40.0 PROSTATE ENLARGEMENT: Status: ACTIVE | Noted: 2023-04-05

## 2023-04-05 PROCEDURE — 3078F DIAST BP <80 MM HG: CPT | Performed by: FAMILY MEDICINE

## 2023-04-05 PROCEDURE — 1170F FXNL STATUS ASSESSED: CPT | Performed by: FAMILY MEDICINE

## 2023-04-05 PROCEDURE — 3074F SYST BP LT 130 MM HG: CPT | Performed by: FAMILY MEDICINE

## 2023-04-05 PROCEDURE — G0439 PPPS, SUBSEQ VISIT: HCPCS | Performed by: FAMILY MEDICINE

## 2023-04-05 RX ORDER — PANTOPRAZOLE SODIUM 40 MG/1
TABLET, DELAYED RELEASE ORAL
COMMUNITY
Start: 2023-04-02

## 2023-04-05 RX ORDER — ALBUTEROL SULFATE 90 UG/1
2 AEROSOL, METERED RESPIRATORY (INHALATION) EVERY 4 HOURS PRN
Qty: 8 G | Refills: 2 | Status: SHIPPED | OUTPATIENT
Start: 2023-04-05

## 2023-04-05 NOTE — PROGRESS NOTES
Subsequent Medicare Wellness Visit    Subjective      Guillaume Rice is a 67 y.o. male who presents for a Subsequent Medicare Wellness Visit.    The following portions of the patient's history were reviewed and   updated as appropriate: allergies, current medications, past family history, past medical history, past social history, past surgical history and problem list.    Compared to one year ago, the patient feels his physical   health is worse.  Recent anterior MI.  Treated at [facility].  Currently being followed by [facility] cardiology.  Reports recent episodes of difficulty breathing with exertion.  Discussed with cardiology.  Advised to monitor physical exertion.    Compared to one year ago, the patient feels his mental   health is the same.  Patient does admit to recent stressors with his spouse.  Reports that he will schedule an appointment for visit with himself with his spouse.    Recent Hospitalizations:  He was admitted within the past 365 days at [facility] - anterior MI.     Current Medical Providers:  Patient Care Team:  Terry Dorantes DO as PCP - General (Family Medicine)    Outpatient Medications Prior to Visit   Medication Sig Dispense Refill   • Aspirin Low Dose 81 MG EC tablet TAKE 1 TABLET BY MOUTH EVERY DAY THIS MEDICATION HAS BEEN SHORT FILLED TO LINE UP WITH YOUR OTHER MEDICATIONS     • atorvastatin (LIPITOR) 40 MG tablet Take 1 tablet by mouth Daily.     • clopidogrel (PLAVIX) 75 MG tablet Take 1 tablet by mouth Daily.     • pantoprazole (PROTONIX) 40 MG EC tablet      • tamsulosin (FLOMAX) 0.4 MG capsule 24 hr capsule TAKE 1 CAPSULE BY MOUTH DAILY 30 capsule 0   • naproxen (EC NAPROSYN) 500 MG EC tablet Take 1 tablet by mouth 2 (Two) Times a Day With Meals. 60 tablet 0   • tiZANidine (ZANAFLEX) 4 MG tablet Take 1 tablet by mouth Every 8 (Eight) Hours As Needed for Muscle Spasms (hold for somnolence). 30 tablet 0     No facility-administered medications prior to visit.       No  "opioid medication identified on active medication list. I have reviewed chart for other potential  high risk medication/s and harmful drug interactions in the elderly.        Aspirin is on active medication list. Aspirin use is indicated based on review of current medical condition/s. Pros and cons of this therapy have been discussed today. Benefits of this medication outweigh potential harm.  Patient has been encouraged to continue taking this medication.  .    Patient Active Problem List   Diagnosis   • BPH (benign prostatic hyperplasia)   • Cataract   • Hyperlipidemia   • Hypertension   • CORIE (obstructive sleep apnea)   • Coronary artery disease involving native coronary artery of native heart with unstable angina pectoris   • Gastroesophageal reflux disease without esophagitis   • Ischemic cardiomyopathy   • PAF (paroxysmal atrial fibrillation)   • History of heart attack   • CHF (congestive heart failure)   • Arthritis   • Prostate enlargement     Advance Care Planning   Advance Care Planning     Advance Directive is not on file.  ACP discussion was held with the patient during this visit. Patient does not have an advance directive, information provided. .  Surrogate decision maker designated as spouse.  Patient provided with CUPR packet.     Objective    Vitals:    04/05/23 1034   BP: 126/68   BP Location: Left arm   Patient Position: Sitting   Cuff Size: Adult   Pulse: 68   Temp: 97.7 °F (36.5 °C)   TempSrc: Temporal   SpO2: 96%   Weight: 74.2 kg (163 lb 9.6 oz)   Height: 170.2 cm (67\")     Estimated body mass index is 25.62 kg/m² as calculated from the following:    Height as of this encounter: 170.2 cm (67\").    Weight as of this encounter: 74.2 kg (163 lb 9.6 oz).    Does the patient have evidence of cognitive impairment?     Patient does admit to difficulty remembering names.      HEALTH RISK ASSESSMENT    Smoking Status:  Social History     Tobacco Use   Smoking Status Former   • " Packs/day: 1.00   • Years: 15.00   • Pack years: 15.00   • Types: Cigarettes   • Start date: 1/1/1968   • Quit date: 7/4/2012   • Years since quitting: 10.7   Smokeless Tobacco Never   Tobacco Comments    1 to 2 ppd      Alcohol Consumption:  Social History     Substance and Sexual Activity   Alcohol Use Yes   • Alcohol/week: 4.0 standard drinks   • Types: 4 Cans of beer per week    Comment: 12 pack beer weekly     Fall Risk Screen:    CHRIS Fall Risk Assessment was completed, and patient is at LOW risk for falls.Assessment completed on:4/5/2023    Depression Screening:  PHQ-2/PHQ-9 Depression Screening 4/5/2023   Little Interest or Pleasure in Doing Things 0-->not at all   Feeling Down, Depressed or Hopeless 0-->not at all   PHQ-9: Brief Depression Severity Measure Score 0       Health Habits and Functional and Cognitive Screening:  Functional & Cognitive Status 4/5/2023   Do you have difficulty preparing food and eating? No   Do you have difficulty bathing yourself, getting dressed or grooming yourself? No   Do you have difficulty using the toilet? No   Do you have difficulty moving around from place to place? No   Do you have trouble with steps or getting out of a bed or a chair? No   Current Diet Well Balanced Diet   Dental Exam Other        Dental Exam Comment has dentures   Eye Exam Up to date        Eye Exam Comment last month in [place]   Exercise (times per week) 7 times per week   Current Exercises Include Yard Work;Other        Exercise Comment Hunting, Fishing   Do you need help using the phone?  No   Are you deaf or do you have serious difficulty hearing?  No   Do you need help with transportation? No   Do you need help shopping? No   Do you need help preparing meals?  No   Do you need help with housework?  No   Do you need help with laundry? No   Do you need help taking your medications? No   Do you need help managing money? No   Do you ever drive or ride in a car without wearing a seat belt? No    Have you felt unusual stress, anger or loneliness in the last month? No   Who do you live with? Spouse   If you need help, do you have trouble finding someone available to you? No   Have you been bothered in the last four weeks by sexual problems? No   Do you have difficulty concentrating, remembering or making decisions? Yes     Age-appropriate Screening Schedule:  Refer to the list below for future screening recommendations based on patient's age, sex and/or medical conditions. Orders for these recommended tests are listed in the plan section. The patient has been provided with a written plan.    Health Maintenance   Topic Date Due   • TDAP/TD VACCINES (1 - Tdap) Never done   • ZOSTER VACCINE (1 of 2) Never done   • COVID-19 Vaccine (4 - Booster for Moderna series) 12/20/2021   • LIPID PANEL  05/24/2023   • INFLUENZA VACCINE  08/01/2023   • ANNUAL WELLNESS VISIT  04/05/2024   • COLORECTAL CANCER SCREENING  06/04/2029   • HEPATITIS C SCREENING  Completed   • Pneumococcal Vaccine 65+  Completed   • AAA SCREEN (ONE-TIME)  Completed        Diagnoses and all orders for this visit:    1. Medicare annual wellness visit, subsequent (Primary)  Comments:  See note.     2. Advance directive discussed with patient  Comments:  Provided with [place] Living Will Packet.    3. Memory change  Comments:  Advised the importance of utilizing games and activities that allows for slower memory decline.    Follow Up:   Next Medicare Wellness visit to be scheduled in 1 year.      An After Visit Summary and PPPS were made available to the patient.

## 2023-04-28 ENCOUNTER — OFFICE VISIT (OUTPATIENT)
Dept: FAMILY MEDICINE CLINIC | Facility: CLINIC | Age: 68
End: 2023-04-28
Payer: MEDICARE

## 2023-04-28 VITALS
SYSTOLIC BLOOD PRESSURE: 126 MMHG | TEMPERATURE: 97.8 F | BODY MASS INDEX: 25.77 KG/M2 | HEART RATE: 66 BPM | DIASTOLIC BLOOD PRESSURE: 70 MMHG | OXYGEN SATURATION: 97 % | HEIGHT: 67 IN | WEIGHT: 164.2 LBS

## 2023-04-28 DIAGNOSIS — M19.90 ARTHRITIS: Primary | ICD-10-CM

## 2023-04-28 PROCEDURE — 86235 NUCLEAR ANTIGEN ANTIBODY: CPT | Performed by: FAMILY MEDICINE

## 2023-04-28 PROCEDURE — 86038 ANTINUCLEAR ANTIBODIES: CPT | Performed by: FAMILY MEDICINE

## 2023-04-28 PROCEDURE — 82550 ASSAY OF CK (CPK): CPT | Performed by: FAMILY MEDICINE

## 2023-04-28 PROCEDURE — 83516 IMMUNOASSAY NONANTIBODY: CPT | Performed by: FAMILY MEDICINE

## 2023-04-28 PROCEDURE — 86431 RHEUMATOID FACTOR QUANT: CPT | Performed by: FAMILY MEDICINE

## 2023-04-28 PROCEDURE — 86200 CCP ANTIBODY: CPT | Performed by: FAMILY MEDICINE

## 2023-04-28 PROCEDURE — 86225 DNA ANTIBODY NATIVE: CPT | Performed by: FAMILY MEDICINE

## 2023-04-28 RX ORDER — ACETAMINOPHEN AND CODEINE PHOSPHATE 300; 30 MG/1; MG/1
1 TABLET ORAL EVERY 4 HOURS PRN
Qty: 20 TABLET | Refills: 0 | Status: SHIPPED | OUTPATIENT
Start: 2023-04-28

## 2023-04-28 NOTE — PROGRESS NOTES
Venipuncture Blood Specimen Collection  Venipuncture performed in left arm by Carmen Leon with good hemostasis. Patient tolerated the procedure well without complications.   04/28/23   Carmen Leon

## 2023-04-28 NOTE — PROGRESS NOTES
"Chief Complaint    Hand Pain (Bilateral hand pain for several years.  Has previously controlled the pain w/ Ibuprofen, but can no longer take due to being on a blood thinner.  Tylenol and Naproxen not helping.)    Subjective      Guillaume Rice presents to [facility]    History of Present Illness    1.) BILATERAL HAND PAIN : Chronic.  Worsening.  Prior treatment with ibuprofen and other NSAIDs, which patient is unable to take at this time secondary to CAD and current Plavix.  Reports pain affecting his ability to perform his ADLs.  Family history significant for rheumatoid arthritis.  No relief with acetaminophen.    Objective     Vital Signs:     /70 (BP Location: Left arm, Patient Position: Sitting, Cuff Size: Adult)   Pulse 66   Temp 97.8 °F (36.6 °C) (Temporal)   Ht 170.2 cm (67\")   Wt 74.5 kg (164 lb 3.2 oz)   SpO2 97%   BMI 25.72 kg/m²       Physical Exam  Vitals reviewed.   Constitutional:       General: He is not in acute distress.     Appearance: Normal appearance. He is well-developed.   HENT:      Head: Normocephalic and atraumatic.      Right Ear: Hearing and external ear normal.      Left Ear: Hearing and external ear normal.      Nose: Nose normal.   Eyes:      General: Lids are normal.         Right eye: No discharge.         Left eye: No discharge.      Conjunctiva/sclera: Conjunctivae normal.   Pulmonary:      Effort: Pulmonary effort is normal.   Abdominal:      General: There is no distension.   Musculoskeletal:         General: No swelling.      Cervical back: Neck supple.      Comments: Edema noted of joints of bilateral hands.   Skin:     Coloration: Skin is not jaundiced.      Findings: No erythema.   Neurological:      Mental Status: He is alert. Mental status is at baseline.   Psychiatric:         Mood and Affect: Mood and affect normal.         Thought Content: Thought content normal.     Assessment and Plan     Diagnoses and all orders for " this visit:    1. Arthritis (Primary)  Comments:  Labs as noted.  If suspicion for RA, will refer to rheumatology.  Trial of Tylenol 3 for pain control.    Orders:  -     JONO Direct Reflex to [facility]  -     Cyclic citrul peptide antibody, IgG/IgA  -     Rheumatoid Factor  -     CK  -     acetaminophen-codeine (TYLENOL #3) 300-30 MG per tablet; Take 1 tablet by mouth Every 4 (Four) Hours As Needed for Moderate Pain.  Dispense: 20 tablet; Refill: 0      Follow Up : TBD per review of labs.    Patient was given instructions and counseling regarding his condition or for health maintenance advice. Please see specific information pulled into the AVS if appropriate.

## 2023-04-29 LAB
CCP IGA+IGG SERPL IA-ACNC: 3 UNITS (ref 0–19)
CHROMATIN AB SERPL-ACNC: <10 IU/ML (ref 0–14)
CK SERPL-CCNC: 75 U/L (ref 20–200)

## 2023-05-01 LAB
ANA SER QL: POSITIVE
CENTROMERE B AB SER-ACNC: <0.2 AI (ref 0–0.9)
CHROMATIN AB SERPL-ACNC: 1.7 AI (ref 0–0.9)
DSDNA AB SER-ACNC: <1 IU/ML (ref 0–9)
ENA JO1 AB SER-ACNC: <0.2 AI (ref 0–0.9)
ENA RNP AB SER-ACNC: 0.2 AI (ref 0–0.9)
ENA SCL70 AB SER-ACNC: <0.2 AI (ref 0–0.9)
ENA SM AB SER-ACNC: <0.2 AI (ref 0–0.9)
ENA SM+RNP AB SER-ACNC: <0.2 AI (ref 0–0.9)
ENA SS-A AB SER-ACNC: <0.2 AI (ref 0–0.9)
ENA SS-B AB SER-ACNC: <0.2 AI (ref 0–0.9)
Lab: ABNORMAL
RIBOSOMAL P AB SER-ACNC: <0.2 AI (ref 0–0.9)

## 2023-05-04 ENCOUNTER — TELEPHONE (OUTPATIENT)
Dept: FAMILY MEDICINE CLINIC | Facility: CLINIC | Age: 68
End: 2023-05-04

## 2023-05-04 NOTE — TELEPHONE ENCOUNTER
Caller: Guillaume Rice    Relationship: Self    Best call back number: [phone]     What is the medical concern/diagnosis: LUPUS    What specialty or service is being requested: RHEUMATOLOGIST    What is the provider, practice or medical service name:     What is the office location:     What is the office phone number:     Any additional details:

## 2023-05-05 DIAGNOSIS — G89.29 CHRONIC PAIN OF MULTIPLE JOINTS: Primary | ICD-10-CM

## 2023-05-05 DIAGNOSIS — M25.50 CHRONIC PAIN OF MULTIPLE JOINTS: Primary | ICD-10-CM

## 2023-08-10 DIAGNOSIS — R35.1 BPH ASSOCIATED WITH NOCTURIA: ICD-10-CM

## 2023-08-10 DIAGNOSIS — N40.1 BPH ASSOCIATED WITH NOCTURIA: ICD-10-CM

## 2023-08-10 RX ORDER — TAMSULOSIN HYDROCHLORIDE 0.4 MG/1
1 CAPSULE ORAL DAILY
Qty: 90 CAPSULE | Refills: 3 | Status: SHIPPED | OUTPATIENT
Start: 2023-08-10

## 2023-08-10 NOTE — TELEPHONE ENCOUNTER
Caller: Guillaume Rice TIERRA    Relationship: Self    Best call back number: [phone]    Requested Prescriptions:   Requested Prescriptions     Pending Prescriptions Disp Refills    tamsulosin (FLOMAX) 0.4 MG capsule 24 hr capsule 30 capsule 0     Sig: Take 1 capsule by mouth Daily.        Pharmacy where request should be sent: [address] [phone] [facility] [phone]      Last office visit with prescribing clinician: 4/28/2023   Last telemedicine visit with prescribing clinician: Visit date not found   Next office visit with prescribing clinician: Visit date not found     Additional details provided by patient:     THE PATIENT IS OUT OF THIS MEDICATION AND WAS ADVISED THERE ARE NO REFILLS     Does the patient have less than a 3 day supply:  [x] Yes  [] No    Would you like a call back once the refill request has been completed: [] Yes [x] No    If the office needs to give you a call back, can they leave a voicemail: [] Yes [x] No    Caden Darling Rep   08/10/23 09:10 EDT

## 2023-08-10 NOTE — TELEPHONE ENCOUNTER
DUPLICATE REQUEST  Caller: Guillaume Rice    Relationship: Self    Best call back number:[phone]    Requested Prescriptions:   Requested Prescriptions     Pending Prescriptions Disp Refills    tamsulosin (FLOMAX) 0.4 MG capsule 24 hr capsule 30 capsule 0     Sig: Take 1 capsule by mouth Daily.        Pharmacy where request should be sent: [address] [phone] [facility] [phone]      Last office visit with prescribing clinician: 4/28/2023   Last telemedicine visit with prescribing clinician: Visit date not found   Next office visit with prescribing clinician: Visit date not found       Does the patient have less than a 3 day supply:  [] Yes  [] No    Would you like a call back once the refill request has been completed: [] Yes [x] No    If the office needs to give you a call back, can they leave a voicemail: [] Yes [x] No    Caden Acevedo Rep   08/10/23 09:20 EDT

## 2023-08-14 ENCOUNTER — OFFICE VISIT (OUTPATIENT)
Dept: FAMILY MEDICINE CLINIC | Facility: CLINIC | Age: 68
End: 2023-08-14
Payer: MEDICARE

## 2023-08-14 VITALS
BODY MASS INDEX: 26 KG/M2 | OXYGEN SATURATION: 96 % | WEIGHT: 166 LBS | TEMPERATURE: 97.2 F | SYSTOLIC BLOOD PRESSURE: 128 MMHG | HEART RATE: 62 BPM | DIASTOLIC BLOOD PRESSURE: 80 MMHG

## 2023-08-14 DIAGNOSIS — N13.8 BPH WITH OBSTRUCTION/LOWER URINARY TRACT SYMPTOMS: Primary | ICD-10-CM

## 2023-08-14 DIAGNOSIS — N40.1 BPH WITH OBSTRUCTION/LOWER URINARY TRACT SYMPTOMS: Primary | ICD-10-CM

## 2023-08-14 LAB — PSA SERPL-MCNC: 2.96 NG/ML (ref 0–4)

## 2023-08-14 PROCEDURE — 84153 ASSAY OF PSA TOTAL: CPT | Performed by: FAMILY MEDICINE

## 2023-08-14 RX ORDER — FINASTERIDE 5 MG/1
5 TABLET, FILM COATED ORAL DAILY
Qty: 30 TABLET | Refills: 0 | Status: SHIPPED | OUTPATIENT
Start: 2023-08-14 | End: 2023-09-13

## 2023-08-14 NOTE — PROGRESS NOTES
Venipuncture Blood Specimen Collection  Venipuncture performed in left arm by Carmen Leon with good hemostasis. Patient tolerated the procedure well without complications.   08/14/23   Carmen Leon

## 2023-08-14 NOTE — PROGRESS NOTES
Chief Complaint    prostate concern    Subjective      Guillaume Rice presents to [facility]    History of Present Illness    1.) URINARY CONCERN : Patient presents with concern regarding recent changes in his urination pattern.  History of BPH for which the patient is currently prescribed tamsulosin.  He denies nocturia.  He denies urinary frequency.  He reports that he has recently started to experience significant straining, while attempting to urinate.  This sometimes results in a bowel movement.  No complaint of fevers or chills.  No complaint of flank pain.  No gross hematuria.  Most recent PSA completed in 2021 and within normal limits.    Objective     Vital Signs:     /80   Pulse 62   Temp 97.2 øF (36.2 øC)   Wt 75.3 kg (166 lb)   SpO2 96%   BMI 26.00 kg/mý       Physical Exam  Vitals reviewed.   Constitutional:       General: He is not in acute distress.     Appearance: Normal appearance. He is well-developed.   HENT:      Head: Normocephalic and atraumatic.      Right Ear: Hearing and external ear normal.      Left Ear: Hearing and external ear normal.      Nose: Nose normal.   Eyes:      General: Lids are normal.         Right eye: No discharge.         Left eye: No discharge.      Conjunctiva/sclera: Conjunctivae normal.   Pulmonary:      Effort: Pulmonary effort is normal.   Abdominal:      General: There is no distension.   Musculoskeletal:         General: No swelling.      Cervical back: Neck supple.   Skin:     Coloration: Skin is not jaundiced.      Findings: No erythema.   Neurological:      Mental Status: He is alert. Mental status is at baseline.   Psychiatric:         Mood and Affect: Mood and affect normal.         Thought Content: Thought content normal.     Assessment and Plan     Diagnoses and all orders for this visit:    1. BPH with obstruction/lower urinary tract symptoms (Primary)  Comments:  Continue tamsulosin. Start finasteride. Repeat  PSA as noted. Advised that per review - will consider referral to urology for input.  Orders:  -     PSA DIAGNOSTIC ONLY    Other orders  -     finasteride (Proscar) 5 MG tablet; Take 1 tablet by mouth Daily for 30 days.  Dispense: 30 tablet; Refill: 0    Follow Up     Return TBD per review of labs.    Patient was given instructions and counseling regarding his condition or for health maintenance advice. Please see specific information pulled into the AVS if appropriate.

## 2023-09-06 RX ORDER — FINASTERIDE 5 MG/1
TABLET, FILM COATED ORAL
Qty: 30 TABLET | Refills: 0 | Status: SHIPPED | OUTPATIENT
Start: 2023-09-06

## 2023-09-29 RX ORDER — FINASTERIDE 5 MG/1
TABLET, FILM COATED ORAL
Qty: 30 TABLET | Refills: 0 | Status: SHIPPED | OUTPATIENT
Start: 2023-09-29

## 2023-10-18 ENCOUNTER — OFFICE VISIT (OUTPATIENT)
Dept: FAMILY MEDICINE CLINIC | Facility: CLINIC | Age: 68
End: 2023-10-18
Payer: MEDICARE

## 2023-10-18 VITALS
WEIGHT: 168 LBS | OXYGEN SATURATION: 94 % | DIASTOLIC BLOOD PRESSURE: 74 MMHG | TEMPERATURE: 96.9 F | HEART RATE: 68 BPM | BODY MASS INDEX: 26.37 KG/M2 | SYSTOLIC BLOOD PRESSURE: 126 MMHG | HEIGHT: 67 IN
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
## 2023-10-18 DIAGNOSIS — S80.861A INSECT BITE OF RIGHT LOWER EXTREMITY, INITIAL ENCOUNTER: Primary | ICD-10-CM

## 2023-10-18 RX ORDER — ISOSORBIDE MONONITRATE 30 MG/1
30 TABLET, EXTENDED RELEASE ORAL DAILY
COMMUNITY
Start: 2023-10-02

## 2023-10-18 NOTE — PROGRESS NOTES
"Chief Complaint    Bite (2 bite marks on RT inner knee and bruising x couple days.  Painful and draining.)    Subjective      Guillaume Rice presents to [facility]    History of Present Illness    1.) LESION OF RIGHT LOWER EXT : First noticed a few days ago.  Patient reports a recent fall in the woods.  Location of lesion - medial aspect of proximal knee joint.  Patient reports concern regarding proximal skin changes.  He has been applying neosporin.  No drainage of puncture wounds.     Objective     Vital Signs:     /74 (BP Location: Left arm, Patient Position: Sitting, Cuff Size: Adult)   Pulse 68   Temp 96.9 °F (36.1 °C) (Temporal)   Ht 170.2 cm (67\")   Wt 76.2 kg (168 lb)   SpO2 94%   BMI 26.31 kg/m²       Physical Exam  Vitals reviewed.   Constitutional:       General: He is not in acute distress.     Appearance: Normal appearance. He is well-developed.   HENT:      Head: Normocephalic and atraumatic.      Right Ear: Hearing and external ear normal.      Left Ear: Hearing and external ear normal.      Nose: Nose normal.   Eyes:      General: Lids are normal.         Right eye: No discharge.         Left eye: No discharge.      Conjunctiva/sclera: Conjunctivae normal.   Pulmonary:      Effort: Pulmonary effort is normal.   Abdominal:      General: There is no distension.   Musculoskeletal:         General: No swelling.      Cervical back: Neck supple.        Legs:       Comments: Location of lesion noted above - puncture wounds x 2 appreciated, no drainage of those areas. Proximal ecchymosis. No significant erythema of skin concerning for cellulitis. No significant tenderness with palpation.   Skin:     Coloration: Skin is not jaundiced.      Findings: No erythema.   Neurological:      Mental Status: He is alert. Mental status is at baseline.   Psychiatric:         Mood and Affect: Mood and affect normal.         Thought Content: Thought content normal. "     Assessment and Plan     Diagnoses and all orders for this visit:    1. Insect bite of right lower extremity, initial encounter (Primary)  Comments:  Well healing lesion. Patient will continue to monitor. Leave uncovered. Watchful waiting at this time. Call ofc with any alarms.    Follow Up : PRN    Patient was given instructions and counseling regarding his condition or for health maintenance advice. Please see specific information pulled into the AVS if appropriate.

## 2023-10-30 RX ORDER — FINASTERIDE 5 MG/1
TABLET, FILM COATED ORAL
Qty: 30 TABLET | Refills: 0 | Status: SHIPPED | OUTPATIENT
Start: 2023-10-30

## 2023-12-01 ENCOUNTER — OFFICE VISIT (OUTPATIENT)
Dept: FAMILY MEDICINE CLINIC | Facility: CLINIC | Age: 68
End: 2023-12-01
Payer: MEDICARE

## 2023-12-01 VITALS
BODY MASS INDEX: 25.58 KG/M2 | OXYGEN SATURATION: 98 % | TEMPERATURE: 97.3 F | HEART RATE: 76 BPM | DIASTOLIC BLOOD PRESSURE: 76 MMHG | HEIGHT: 67 IN | WEIGHT: 163 LBS | SYSTOLIC BLOOD PRESSURE: 148 MMHG

## 2023-12-01 DIAGNOSIS — I15.9 SECONDARY HYPERTENSION: Primary | ICD-10-CM

## 2023-12-01 PROCEDURE — 1160F RVW MEDS BY RX/DR IN RCRD: CPT | Performed by: FAMILY MEDICINE

## 2023-12-01 PROCEDURE — 3077F SYST BP >= 140 MM HG: CPT | Performed by: FAMILY MEDICINE

## 2023-12-01 PROCEDURE — 3078F DIAST BP <80 MM HG: CPT | Performed by: FAMILY MEDICINE

## 2023-12-01 PROCEDURE — 1159F MED LIST DOCD IN RCRD: CPT | Performed by: FAMILY MEDICINE

## 2023-12-01 PROCEDURE — 99214 OFFICE O/P EST MOD 30 MIN: CPT | Performed by: FAMILY MEDICINE

## 2023-12-01 RX ORDER — RANOLAZINE 500 MG/1
1 TABLET, EXTENDED RELEASE ORAL EVERY 12 HOURS SCHEDULED
COMMUNITY
Start: 2023-11-13

## 2023-12-01 RX ORDER — OMEPRAZOLE 40 MG/1
1 CAPSULE, DELAYED RELEASE ORAL DAILY
COMMUNITY
Start: 2023-10-23

## 2023-12-01 RX ORDER — HYDROCHLOROTHIAZIDE 12.5 MG/1
12.5 TABLET ORAL EVERY MORNING
Qty: 30 TABLET | Refills: 0 | Status: SHIPPED | OUTPATIENT
Start: 2023-12-01

## 2023-12-01 NOTE — PROGRESS NOTES
"Chief Complaint    Hypertension (BP has been fluctuating over past 3 weeks (started after he began taking Ranolazine ER 500mg that was started by his Cardiologist).  Is not currently taking any BP medication)    Subjective      Guillaume Rice presents to [facility]    History of Present Illness    1.) HYPERTENSION :  Hx of primary HTN.  Recent worsening.  Patient presents regarding increase in blood pressure after recently starting ranolazine.  History of controlled blood pressure without medication.  Presents today reporting an average systolic blood pressure measuring in the 140s at home.  Diastolic blood pressure measuring in the 80s at home.    Objective     Vital Signs:     /76 (BP Location: Left arm, Patient Position: Sitting, Cuff Size: Adult)   Pulse 76   Temp 97.3 °F (36.3 °C) (Temporal)   Ht 170.2 cm (67\")   Wt 73.9 kg (163 lb)   SpO2 98%   BMI 25.53 kg/m²       Physical Exam  Vitals reviewed.   Constitutional:       General: He is not in acute distress.     Appearance: Normal appearance. He is well-developed.   HENT:      Head: Normocephalic and atraumatic.      Right Ear: Hearing and external ear normal.      Left Ear: Hearing and external ear normal.      Nose: Nose normal.   Eyes:      General: Lids are normal.         Right eye: No discharge.         Left eye: No discharge.      Conjunctiva/sclera: Conjunctivae normal.   Pulmonary:      Effort: Pulmonary effort is normal.   Abdominal:      General: There is no distension.   Musculoskeletal:         General: No swelling.      Cervical back: Neck supple.   Skin:     Coloration: Skin is not jaundiced.      Findings: No erythema.   Neurological:      Mental Status: He is alert. Mental status is at baseline.   Psychiatric:         Mood and Affect: Mood and affect normal.         Thought Content: Thought content normal.     Assessment and Plan     Diagnoses and all orders for this visit:    1. Secondary " hypertension (Primary)  Comments:  Shared decision to start diuretic as noted.  Patient will continue to monitor blood pressure at home.  If any concerning issues, discontinue rx and call ofc.    Other orders  -     hydroCHLOROthiazide (HYDRODIURIL) 12.5 MG tablet; Take 1 tablet by mouth Every Morning.  Dispense: 30 tablet; Refill: 0    Follow Up : Pt will call in 2 weeks regarding sxs.     Patient was given instructions and counseling regarding his condition or for health maintenance advice. Please see specific information pulled into the AVS if appropriate.

## 2023-12-05 RX ORDER — FINASTERIDE 5 MG/1
TABLET, FILM COATED ORAL
Qty: 30 TABLET | Refills: 0 | Status: SHIPPED | OUTPATIENT
Start: 2023-12-05

## 2024-01-02 RX ORDER — FINASTERIDE 5 MG/1
TABLET, FILM COATED ORAL
Qty: 30 TABLET | Refills: 0 | Status: SHIPPED | OUTPATIENT
Start: 2024-01-02

## 2024-01-02 RX ORDER — HYDROCHLOROTHIAZIDE 12.5 MG/1
12.5 TABLET ORAL EVERY MORNING
Qty: 30 TABLET | Refills: 0 | Status: SHIPPED | OUTPATIENT
Start: 2024-01-02

## 2024-01-15 ENCOUNTER — OFFICE VISIT (OUTPATIENT)
Dept: FAMILY MEDICINE CLINIC | Facility: CLINIC | Age: 69
End: 2024-01-15
Payer: MEDICARE

## 2024-01-15 VITALS
TEMPERATURE: 97.8 F | OXYGEN SATURATION: 99 % | SYSTOLIC BLOOD PRESSURE: 112 MMHG | DIASTOLIC BLOOD PRESSURE: 70 MMHG | BODY MASS INDEX: 25.22 KG/M2 | HEART RATE: 77 BPM | WEIGHT: 161 LBS

## 2024-01-15 DIAGNOSIS — B35.9 TINEA: Primary | ICD-10-CM

## 2024-01-15 PROCEDURE — 3078F DIAST BP <80 MM HG: CPT | Performed by: FAMILY MEDICINE

## 2024-01-15 PROCEDURE — 99213 OFFICE O/P EST LOW 20 MIN: CPT | Performed by: FAMILY MEDICINE

## 2024-01-15 PROCEDURE — 3074F SYST BP LT 130 MM HG: CPT | Performed by: FAMILY MEDICINE

## 2024-01-15 RX ORDER — CLOTRIMAZOLE AND BETAMETHASONE DIPROPIONATE 10; .64 MG/G; MG/G
1 CREAM TOPICAL 2 TIMES DAILY
Qty: 15 G | Refills: 0 | Status: SHIPPED | OUTPATIENT
Start: 2024-01-15 | End: 2024-01-20

## 2024-01-15 NOTE — PROGRESS NOTES
Chief Complaint    Wound Check (Patient has a circular spot on ankle that is red/itching)    Subjective      Guillaume Rice presents to [facility]    History of Present Illness    1.) SKIN LESION OF RIGHT ANKLE : Onset - > 24 hours. Location - distal aspect of medial ankle. Circular lesion. Central clearing with erythema on periphery. Patient has been utilizing a topical abx with no relief.    Objective     Vital Signs:     /70   Pulse 77   Temp 97.8 °F (36.6 °C)   Wt 73 kg (161 lb)   SpO2 99%   BMI 25.22 kg/m²       Physical Exam  Vitals reviewed.   Constitutional:       General: He is not in acute distress.     Appearance: Normal appearance. He is well-developed.   HENT:      Head: Normocephalic and atraumatic.      Right Ear: Hearing and external ear normal.      Left Ear: Hearing and external ear normal.      Nose: Nose normal.   Eyes:      General: Lids are normal.         Right eye: No discharge.         Left eye: No discharge.      Conjunctiva/sclera: Conjunctivae normal.   Pulmonary:      Effort: Pulmonary effort is normal.   Abdominal:      General: There is no distension.   Musculoskeletal:         General: No swelling.      Cervical back: Neck supple.   Skin:     Coloration: Skin is not jaundiced.      Findings: No erythema.      Comments: Examination of medial aspect of distal right ankle reveals approximately 1 cm circumferential lesion - papular erythema noted on periphery with central clearing.    Neurological:      Mental Status: He is alert. Mental status is at baseline.   Psychiatric:         Mood and Affect: Mood and affect normal.         Thought Content: Thought content normal.     Assessment and Plan     Diagnoses and all orders for this visit:    1. Tinea (Primary)  Comments:  Topical rx as noted. Call ofc if no relief.    Other orders  -     clotrimazole-betamethasone (Lotrisone) 1-0.05 % cream; Apply 1 application  topically to the appropriate  area as directed 2 (Two) Times a Day for 5 days.  Dispense: 15 g; Refill: 0    Follow Up     Return if symptoms worsen or fail to improve.    Patient was given instructions and counseling regarding his condition or for health maintenance advice. Please see specific information pulled into the AVS if appropriate.

## 2024-01-29 RX ORDER — FINASTERIDE 5 MG/1
TABLET, FILM COATED ORAL
Qty: 30 TABLET | Refills: 0 | Status: SHIPPED | OUTPATIENT
Start: 2024-01-29

## 2024-01-29 RX ORDER — HYDROCHLOROTHIAZIDE 12.5 MG/1
12.5 TABLET ORAL EVERY MORNING
Qty: 30 TABLET | Refills: 0 | Status: SHIPPED | OUTPATIENT
Start: 2024-01-29

## 2024-02-26 RX ORDER — HYDROCHLOROTHIAZIDE 12.5 MG/1
12.5 TABLET ORAL EVERY MORNING
Qty: 30 TABLET | Refills: 0 | Status: SHIPPED | OUTPATIENT
Start: 2024-02-26

## 2024-02-26 RX ORDER — FINASTERIDE 5 MG/1
TABLET, FILM COATED ORAL
Qty: 30 TABLET | Refills: 0 | Status: SHIPPED | OUTPATIENT
Start: 2024-02-26

## 2024-03-05 ENCOUNTER — OFFICE VISIT (OUTPATIENT)
Dept: FAMILY MEDICINE CLINIC | Facility: CLINIC | Age: 69
End: 2024-03-05
Payer: MEDICARE

## 2024-03-05 VITALS
SYSTOLIC BLOOD PRESSURE: 138 MMHG | HEIGHT: 67 IN | HEART RATE: 93 BPM | WEIGHT: 163.6 LBS | OXYGEN SATURATION: 99 % | TEMPERATURE: 97.8 F | BODY MASS INDEX: 25.68 KG/M2 | DIASTOLIC BLOOD PRESSURE: 62 MMHG

## 2024-03-05 DIAGNOSIS — M54.12 CERVICAL RADICULOPATHY: Primary | ICD-10-CM

## 2024-03-05 PROCEDURE — 3075F SYST BP GE 130 - 139MM HG: CPT | Performed by: FAMILY MEDICINE

## 2024-03-05 PROCEDURE — 99213 OFFICE O/P EST LOW 20 MIN: CPT | Performed by: FAMILY MEDICINE

## 2024-03-05 PROCEDURE — 3078F DIAST BP <80 MM HG: CPT | Performed by: FAMILY MEDICINE

## 2024-03-05 NOTE — PROGRESS NOTES
"Chief Complaint    Neck Pain (Having pain between both shoulder blades and numbness in RT arm.  Was seen by cardiologist yesterday and was told he may have a pinched nerve.  Had a stress test recently and is scheduled for upcoming heart cath.)    Subjective      Guillaume Rice presents to [facility]    History of Present Illness    1.) CERVICAL PAIN/RT UPPER EXT SXS  : Patient presents with c/o right upper extremity pain.  Reports pain originating in his posterior cervical, as well as, scapular region.  Recent evaluation per cardiology - no concern for cardiac etiology.  Reports a numbness and tingling of his right upper extremity.  No reports of decrease in strength of right upper extremity.  No reported remitting or exacerbating factors.    Objective     Vital Signs:     /62 (BP Location: Left arm, Patient Position: Sitting, Cuff Size: Adult)   Pulse 93   Temp 97.8 °F (36.6 °C) (Temporal)   Ht 170.2 cm (67\")   Wt 74.2 kg (163 lb 9.6 oz)   SpO2 99%   BMI 25.62 kg/m²       Physical Exam  Vitals reviewed.   Constitutional:       General: He is not in acute distress.     Appearance: Normal appearance. He is well-developed.   HENT:      Head: Normocephalic and atraumatic.      Right Ear: Hearing and external ear normal.      Left Ear: Hearing and external ear normal.      Nose: Nose normal.   Eyes:      General: Lids are normal.         Right eye: No discharge.         Left eye: No discharge.      Conjunctiva/sclera: Conjunctivae normal.   Pulmonary:      Effort: Pulmonary effort is normal.   Abdominal:      General: There is no distension.   Musculoskeletal:         General: No swelling.      Cervical back: Neck supple.   Skin:     Coloration: Skin is not jaundiced.      Findings: No erythema.   Neurological:      Mental Status: He is alert. Mental status is at baseline.   Psychiatric:         Mood and Affect: Mood and affect normal.         Thought Content: Thought " content normal.     Assessment and Plan     Diagnoses and all orders for this visit:    1. Cervical radiculopathy (Primary)  Comments:  Imaging of cervical spine is noted. Will await official radiology report. Will provide recommendations at that time.  Orders:  -     XR Spine Cervical Complete 4 or 5 View    Follow Up     Return TBD per review of imaging.    Patient was given instructions and counseling regarding his condition or for health maintenance advice. Please see specific information pulled into the AVS if appropriate.

## 2024-03-27 RX ORDER — FINASTERIDE 5 MG/1
TABLET, FILM COATED ORAL
Qty: 90 TABLET | Refills: 0 | Status: SHIPPED | OUTPATIENT
Start: 2024-03-27

## 2024-03-27 RX ORDER — HYDROCHLOROTHIAZIDE 12.5 MG/1
12.5 TABLET ORAL EVERY MORNING
Qty: 90 TABLET | Refills: 0 | Status: SHIPPED | OUTPATIENT
Start: 2024-03-27

## 2024-05-09 ENCOUNTER — OFFICE VISIT (OUTPATIENT)
Dept: FAMILY MEDICINE CLINIC | Facility: CLINIC | Age: 69
End: 2024-05-09
Payer: MEDICARE

## 2024-05-09 VITALS
DIASTOLIC BLOOD PRESSURE: 80 MMHG | HEIGHT: 66 IN | HEART RATE: 73 BPM | BODY MASS INDEX: 27 KG/M2 | WEIGHT: 168 LBS | OXYGEN SATURATION: 98 % | SYSTOLIC BLOOD PRESSURE: 120 MMHG

## 2024-05-09 DIAGNOSIS — N40.1 BENIGN PROSTATIC HYPERPLASIA WITH POST-VOID DRIBBLING: ICD-10-CM

## 2024-05-09 DIAGNOSIS — I73.9 PVD (PERIPHERAL VASCULAR DISEASE): ICD-10-CM

## 2024-05-09 DIAGNOSIS — R20.2 PARESTHESIA OF BOTH FEET: ICD-10-CM

## 2024-05-09 DIAGNOSIS — Z13.1 SCREENING FOR DIABETES MELLITUS: ICD-10-CM

## 2024-05-09 DIAGNOSIS — Z71.85 VACCINE COUNSELING: ICD-10-CM

## 2024-05-09 DIAGNOSIS — I10 PRIMARY HYPERTENSION: ICD-10-CM

## 2024-05-09 DIAGNOSIS — Z95.5 HISTORY OF CORONARY ANGIOPLASTY WITH INSERTION OF STENT: ICD-10-CM

## 2024-05-09 DIAGNOSIS — Z12.11 COLON CANCER SCREENING: ICD-10-CM

## 2024-05-09 DIAGNOSIS — I25.5 ISCHEMIC CARDIOMYOPATHY: ICD-10-CM

## 2024-05-09 DIAGNOSIS — M79.672 BILATERAL FOOT PAIN: ICD-10-CM

## 2024-05-09 DIAGNOSIS — Z76.89 ENCOUNTER TO ESTABLISH CARE: ICD-10-CM

## 2024-05-09 DIAGNOSIS — N39.43 BENIGN PROSTATIC HYPERPLASIA WITH POST-VOID DRIBBLING: ICD-10-CM

## 2024-05-09 DIAGNOSIS — Z12.2 SCREENING FOR LUNG CANCER: ICD-10-CM

## 2024-05-09 DIAGNOSIS — Z00.00 MEDICARE ANNUAL WELLNESS VISIT, SUBSEQUENT: Primary | ICD-10-CM

## 2024-05-09 DIAGNOSIS — Z87.891 FORMER SMOKER: ICD-10-CM

## 2024-05-09 DIAGNOSIS — E78.2 MIXED HYPERLIPIDEMIA: ICD-10-CM

## 2024-05-09 DIAGNOSIS — M51.36 DDD (DEGENERATIVE DISC DISEASE), LUMBAR: ICD-10-CM

## 2024-05-09 DIAGNOSIS — M79.671 BILATERAL FOOT PAIN: ICD-10-CM

## 2024-05-09 LAB
FERRITIN SERPL-MCNC: 355 NG/ML (ref 30–400)
HBA1C MFR BLD: 5.8 % (ref 4.8–5.6)
IRON 24H UR-MRATE: 75 MCG/DL (ref 59–158)
IRON SATN MFR SERPL: 23 % (ref 20–50)
TIBC SERPL-MCNC: 331 MCG/DL (ref 298–536)
TRANSFERRIN SERPL-MCNC: 222 MG/DL (ref 200–360)

## 2024-05-09 PROCEDURE — 84466 ASSAY OF TRANSFERRIN: CPT | Performed by: NURSE PRACTITIONER

## 2024-05-09 PROCEDURE — 82728 ASSAY OF FERRITIN: CPT | Performed by: NURSE PRACTITIONER

## 2024-05-09 PROCEDURE — 83036 HEMOGLOBIN GLYCOSYLATED A1C: CPT | Performed by: NURSE PRACTITIONER

## 2024-05-09 PROCEDURE — 83540 ASSAY OF IRON: CPT | Performed by: NURSE PRACTITIONER

## 2024-05-09 RX ORDER — CLOPIDOGREL BISULFATE 75 MG/1
75 TABLET ORAL DAILY
COMMUNITY
Start: 2024-03-19

## 2024-05-09 RX ORDER — PANTOPRAZOLE SODIUM 40 MG/1
40 TABLET, DELAYED RELEASE ORAL DAILY
COMMUNITY
Start: 2024-03-19

## 2024-05-13 ENCOUNTER — TELEPHONE (OUTPATIENT)
Dept: GASTROENTEROLOGY | Facility: CLINIC | Age: 69
End: 2024-05-13
Payer: MEDICARE

## 2024-05-13 NOTE — TELEPHONE ENCOUNTER
Call placed to patient in attempt to schedule colon screening-Patient states he does not wish to go forward with colonoscopy

## 2024-05-15 ENCOUNTER — TELEPHONE (OUTPATIENT)
Dept: FAMILY MEDICINE CLINIC | Facility: CLINIC | Age: 69
End: 2024-05-15
Payer: MEDICARE

## 2024-05-15 ENCOUNTER — CLINICAL SUPPORT (OUTPATIENT)
Dept: FAMILY MEDICINE CLINIC | Facility: CLINIC | Age: 69
End: 2024-05-15
Payer: MEDICARE

## 2024-05-15 DIAGNOSIS — E78.2 MIXED HYPERLIPIDEMIA: ICD-10-CM

## 2024-05-15 DIAGNOSIS — Z00.00 MEDICARE ANNUAL WELLNESS VISIT, SUBSEQUENT: ICD-10-CM

## 2024-05-15 DIAGNOSIS — M79.672 BILATERAL FOOT PAIN: Primary | ICD-10-CM

## 2024-05-15 DIAGNOSIS — I10 PRIMARY HYPERTENSION: Primary | ICD-10-CM

## 2024-05-15 DIAGNOSIS — M79.671 BILATERAL FOOT PAIN: Primary | ICD-10-CM

## 2024-05-15 DIAGNOSIS — R20.2 PARESTHESIA OF BOTH FEET: ICD-10-CM

## 2024-05-15 LAB
FOLATE SERPL-MCNC: 5.72 NG/ML (ref 4.78–24.2)
VIT B12 BLD-MCNC: 640 PG/ML (ref 211–946)

## 2024-05-15 PROCEDURE — 36415 COLL VENOUS BLD VENIPUNCTURE: CPT | Performed by: NURSE PRACTITIONER

## 2024-05-15 PROCEDURE — 82746 ASSAY OF FOLIC ACID SERUM: CPT | Performed by: NURSE PRACTITIONER

## 2024-05-15 PROCEDURE — 82607 VITAMIN B-12: CPT | Performed by: NURSE PRACTITIONER

## 2024-05-15 NOTE — TELEPHONE ENCOUNTER
Caller: Dwayne Guillaume TIERRA    Relationship: Self    Best call back number: [phone]    Caller requesting test results: PATIENT    What test was performed: XRAYS OF FEET BLOOD LABS    When was the test performed: 05/09/2024    Where was the test performed: [facility]    Additional notes: PATIENT HAD BLOOD LABS AND XRAYS ON HIS FEET. RESULTS WERE POSTED BUT HE DOES NOT UNDERSTAND THE TERMINOLOGY. PLEASE CALL PATIENT BACK AND GO OVER RESULTS AND ADVISE.

## 2024-05-15 NOTE — PROGRESS NOTES
..  Venipuncture Blood Specimen Collection  Venipuncture performed in LT arm by Candace Arias with good hemostasis. Patient tolerated the procedure well without complications.   05/15/24   Nelli Monge MA

## 2024-06-18 ENCOUNTER — OFFICE VISIT (OUTPATIENT)
Dept: PODIATRY | Facility: CLINIC | Age: 69
End: 2024-06-18
Payer: MEDICARE

## 2024-06-18 VITALS
SYSTOLIC BLOOD PRESSURE: 122 MMHG | WEIGHT: 164 LBS | OXYGEN SATURATION: 95 % | HEIGHT: 66 IN | BODY MASS INDEX: 26.36 KG/M2 | DIASTOLIC BLOOD PRESSURE: 62 MMHG | HEART RATE: 55 BPM | TEMPERATURE: 97.8 F

## 2024-06-18 DIAGNOSIS — M79.672 FOOT PAIN, BILATERAL: Primary | ICD-10-CM

## 2024-06-18 DIAGNOSIS — M72.2 PLANTAR FASCIITIS: ICD-10-CM

## 2024-06-18 DIAGNOSIS — M79.671 FOOT PAIN, BILATERAL: Primary | ICD-10-CM

## 2024-06-18 RX ORDER — BUPIVACAINE HYDROCHLORIDE 5 MG/ML
0.5 INJECTION, SOLUTION PERINEURAL ONCE
Status: COMPLETED | OUTPATIENT
Start: 2024-06-18 | End: 2024-06-18

## 2024-06-18 RX ORDER — TRIAMCINOLONE ACETONIDE 40 MG/ML
20 INJECTION, SUSPENSION INTRA-ARTICULAR; INTRAMUSCULAR ONCE
Status: COMPLETED | OUTPATIENT
Start: 2024-06-18 | End: 2024-06-18

## 2024-06-18 RX ORDER — TESTOSTERONE CYPIONATE 200 MG/ML
4 INJECTION INTRAMUSCULAR ONCE
Status: COMPLETED | OUTPATIENT
Start: 2024-06-18 | End: 2024-06-18

## 2024-06-18 RX ADMIN — TRIAMCINOLONE ACETONIDE 20 MG: 40 INJECTION, SUSPENSION INTRA-ARTICULAR; INTRAMUSCULAR at 09:44

## 2024-06-18 RX ADMIN — TESTOSTERONE CYPIONATE 4 MG: 200 INJECTION INTRAMUSCULAR at 09:45

## 2024-06-18 RX ADMIN — BUPIVACAINE HYDROCHLORIDE 0.5 ML: 5 INJECTION, SOLUTION PERINEURAL at 09:45

## 2024-06-18 NOTE — LETTER
June 18, 2024       No Recipients    Patient: Guillaume Rice   YOB: 1955   Date of Visit: 6/18/2024       Dear Lola Kevin APRN:    Thank you for referring Guillaume Rice to me for evaluation. Below are the relevant portions of my assessment and plan of care.    Encounter Diagnosis and Orders:  Diagnoses and all orders for this visit:    1. Foot pain, bilateral (Primary)    2. Plantar fasciitis  -     dexAMETHasone sodium phosphate injection 4 mg  -     bupivacaine (MARCAINE) 0.5 % injection 0.5 mL  -     triamcinolone acetonide (KENALOG-40) injection 20 mg        If you have questions, please do not hesitate to call me. I look forward to following Guillaume along with you.         Sincerely,        Rickey Valencia DPM        CC:   No Recipients

## 2024-06-18 NOTE — PROGRESS NOTES
[facility] - PODIATRY    Today's Date: 24    Patient Name: Guillaume Rice  MRN: [id number]  CSN: [id number]  PCP: Lola Kevin APRN  Referring Provider: Lola Kevin A*    SUBJECTIVE     Chief Complaint   Patient presents with    Left Foot - Pain, Establish Care    Right Foot - Pain, Establish Care     HPI: Guillaume Rice, a 68 y.o.male, comes to clinic.    New, Established, New Problem:  new    Location: Bilateral heels, right significantly worse than left    Duration:   6 months    Onset:  gradual    Nature:  achy, sharp, shooting    Aggravating factors:  Patient describes morning bilateral heel pain as stabbing, burning, or aching. This pain usually subsides throughout the day, however it returns after periods of rest and sitting, when standing back up on their feet, and again the next morning.      Previous Treatment:  xrays    Patient denies any fevers, chills, nausea, vomiting, shortness of breath, nor any other constitutional signs nor symptoms.    No other pedal complaints at this time.    Past Medical History:   Diagnosis Date    Abnormal ECG     Arthritis     BPH (benign prostatic hyperplasia)     Cataract     CHF (congestive heart failure) 2022    Clotting disorder     Coronary artery disease     Hyperlipidemia     Hypertension     Myocardial infarction     Pulmonary arterial hypertension      Past Surgical History:   Procedure Laterality Date    COLONOSCOPY  2017    CORONARY STENT PLACEMENT  2022     Family History   Problem Relation Age of Onset    Hypertension Mother             Hyperlipidemia Mother     Hypertension Father     Hyperlipidemia Father     Heart disease Maternal Uncle     Heart disease Brother      Social History     Socioeconomic History    Marital status:    Tobacco Use    Smoking status: Former     Current packs/day: 0.00     Average packs/day: 1 pack/day for 44.5 years (44.5 ttl pk-yrs)     Types: Cigarettes      Start date: 1968     Quit date: 2012     Years since quittin.9    Smokeless tobacco: Never    Tobacco comments:     1 to 2 ppd    Vaping Use    Vaping status: Never Used   Substance and Sexual Activity    Alcohol use: Yes     Alcohol/week: 4.0 standard drinks of alcohol     Types: 4 Cans of beer per week     Comment: usually has a drink in the summer when mowing    Drug use: Not Currently    Sexual activity: Defer     No Known Allergies  Current Outpatient Medications   Medication Sig Dispense Refill    albuterol sulfate HFA (ProAir HFA) 108 (90 Base) MCG/ACT inhaler Inhale 2 puffs Every 4 (Four) Hours As Needed for Wheezing. 8 g 2    Aspirin Low Dose 81 MG EC tablet TAKE 1 TABLET BY MOUTH EVERY DAY THIS MEDICATION HAS BEEN SHORT FILLED TO LINE UP WITH YOUR OTHER MEDICATIONS      atorvastatin (LIPITOR) 40 MG tablet Take 1 tablet by mouth Daily.      clopidogrel (PLAVIX) 75 MG tablet Take 1 tablet by mouth Daily.      finasteride (PROSCAR) 5 MG tablet TAKE 1 TABLET BY MOUTH EVERY DAY 90 tablet 0    hydroCHLOROthiazide 12.5 MG tablet TAKE 1 TABLET BY MOUTH EVERY MORNING 90 tablet 0    pantoprazole (PROTONIX) 40 MG EC tablet Take 1 tablet by mouth Daily.      tamsulosin (FLOMAX) 0.4 MG capsule 24 hr capsule Take 1 capsule by mouth Daily. 90 capsule 3     Current Facility-Administered Medications   Medication Dose Route Frequency Provider Last Rate Last Admin    bupivacaine (MARCAINE) 0.5 % injection 0.5 mL  0.5 mL Injection Once Rickey Valencia DPM        dexAMETHasone sodium phosphate injection 4 mg  4 mg Intra-articular Once Rickey Valencia DPM        triamcinolone acetonide (KENALOG-40) injection 20 mg  20 mg Intramuscular Once Rickey Valencia DPM         Review of Systems   Constitutional: Negative.    Musculoskeletal:         Bilateral heel pain   All other systems reviewed and are negative.      OBJECTIVE     Vitals:    24 0845   BP: 122/62   Pulse: 55   Temp: 97.8 °F  (36.6 °C)   SpO2: 95%       PHYSICAL EXAM     Foot/Ankle Exam    GENERAL  Appearance:  appears stated age and chronically ill  Orientation:  AAOx3  Affect:  appropriate  Gait:  unimpaired  Assistance:  independent  Right shoe gear: casual shoe  Left shoe gear: casual shoe    VASCULAR     Right Foot Vascularity   Normal vascular exam    Dorsalis pedis:  2+  Posterior tibial:  2+  Skin temperature:  warm  Edema grading:  None  CFT:  < 3 seconds  Pedal hair growth:  Present  Varicosities:  none     Left Foot Vascularity   Normal vascular exam    Dorsalis pedis:  2+  Posterior tibial:  2+  Skin temperature:  warm  Edema grading:  None  CFT:  < 3 seconds  Pedal hair growth:  Present  Varicosities:  none     NEUROLOGIC     Right Foot Neurologic   Normal sensation    Light touch sensation: normal  Vibratory sensation: normal  Hot/Cold sensation: normal     Left Foot Neurologic   Normal sensation    Light touch sensation: normal  Vibratory sensation: normal  Hot/Cold sensation:  normal    MUSCULOSKELETAL     Right Foot Musculoskeletal   Ecchymosis:  none  Tenderness:  plantar fascia tenderness       Left Foot Musculoskeletal   Ecchymosis:  none  Tenderness:  plantar fascia tenderness    MUSCLE STRENGTH     Right Foot Muscle Strength   Foot dorsiflexion:  4  Foot plantar flexion:  4  Foot inversion:  4  Foot eversion:  4     Left Foot Muscle Strength   Foot dorsiflexion:  4  Foot plantar flexion:  4  Foot inversion:  4  Foot eversion:  4    RANGE OF MOTION     Right Foot Range of Motion   Foot and ankle ROM within normal limits       Left Foot Range of Motion   Foot and ankle ROM within normal limits      DERMATOLOGIC      Right Foot Dermatologic   Skin  Right foot skin is intact.   Nails comment:  Toenails 1, 2, 3, 4, and 5     Left Foot Dermatologic   Skin  Left foot skin is intact.   Nails comment:  Toenails 1, 2, 3, 4, and 5      RADIOLOGY:    Narrative & Impression   XR FOOT 3+ VW BILATERAL-     Date of Exam: 5/9/2024  "9:36 AM     Indication: bilateral foot pain, numbness/tingling; M79.671-Pain in  right foot; M79.672-Pain in left foot; R20.2-Paresthesia of skin     Comparison: None available.     Findings:  There is no evidence of fracture. Normal joint alignment. Mild left and  moderate right first MTP joint arthritis. Tiny bilateral dorsal  calcaneal enthesophytes. Bipartite right lateral hallux sesamoid. Soft  tissues are unremarkable.     IMPRESSION:  Impression:  No acute abnormality. Mild left and moderate right first MTP joint  arthritis.        Electronically Signed By-Akin Jackson MD On:5/9/2024 2:08 PM        ASSESSMENT/PLAN     Diagnoses and all orders for this visit:    1. Foot pain, bilateral (Primary)    2. Plantar fasciitis  -     dexAMETHasone sodium phosphate injection 4 mg  -     bupivacaine (MARCAINE) 0.5 % injection 0.5 mL  -     triamcinolone acetonide (KENALOG-40) injection 20 mg      Comprehensive lower extremity examination and evaluation was performed.    Discussed findings and treatment plan including risks, benefits, and treatment options with patient in detail. Patient agreed with treatment plan.    Plantar Fasciits Injection:    Date/Time: 06/18/2024  Performed by: Rickey Valencia DPM  Authorized by: Rickey Valencia DPM     Consent: Verbal consent obtained. Written consent obtained.  Risks and benefits: risks, benefits and alternatives were discussed  Consent given by: patient  Patient identity confirmed: verbally with patient  Indications: pain relief    Injection site: Right heel    Sedation:  none    Patient position: sitting  Needle size: 27 G, 1 1/4\" in length  Injection medications:  0.5 ml 0.25% Marcaine plain, 0.5 ml Kenalog 40 mg/ml, and 0.5 ml Decadron 4 mg/mL.   Outcome: pain improved    Patient tolerated the procedure well with no immediate complications.    Treatment Options discussed:  - no treatment at all  - change in shoegear  - change in activities  - RICE therapy  - arch " support  - PO steroids  - injectable steroids    Patient may begin to weight bear as tolerated in supportive shoes.  No impact activities for two weeks.  After that time, the patient may increase activities as tolerated. Patient states understanding and agreement with this plan.    Rice Therapy: It is important to treat any injury as soon as possible to help control swelling and increase recovery time. The recognized regimen for immediate treatment of sport injuries includes rest, ice (cold application), compression, and elevation (RICE). Remove the injured athlete from play, apply ice to the affected area, wrap or compress the injured area with an elastic bandage when appropriate, and elevate the injured area above heart level to reduce swelling.  The patient is to not use ice for longer than 20 minutes at a time, with at least 20 minutes of no ice usage between applications.  The patient states understanding and agreement with this plan.    Discussed proper shoegear for the patient's feet and medical condition.  Patient states understanding and agreement with this plan.    An After Visit Summary was printed and given to the patient at discharge, including (if requested) any available informative/educational handouts regarding diagnosis, treatment, or medications. All questions were answered to patient/family satisfaction. Should symptoms fail to improve or worsen they agree to call or return to clinic or to go to the Emergency Department. Discussed the importance of following up with any needed screening tests/labs/specialist appointments and any requested follow-up recommended by me today. Importance of maintaining follow-up discussed and patient accepts that missed appointments can delay diagnosis and potentially lead to worsening of conditions.    Return if symptoms worsen or fail to improve., or sooner if acute issues arise.    This document has been electronically signed by Rickey Valencia DPM on June 18,  2024 09:22 EDT

## 2024-06-28 RX ORDER — FINASTERIDE 5 MG/1
TABLET, FILM COATED ORAL
Qty: 90 TABLET | Refills: 1 | Status: SHIPPED | OUTPATIENT
Start: 2024-06-28

## 2024-06-28 RX ORDER — HYDROCHLOROTHIAZIDE 12.5 MG/1
12.5 TABLET ORAL EVERY MORNING
Qty: 90 TABLET | Refills: 1 | Status: SHIPPED | OUTPATIENT
Start: 2024-06-28

## 2024-07-26 DIAGNOSIS — R35.1 BPH ASSOCIATED WITH NOCTURIA: ICD-10-CM

## 2024-07-26 DIAGNOSIS — N40.1 BPH ASSOCIATED WITH NOCTURIA: ICD-10-CM

## 2024-07-29 RX ORDER — TAMSULOSIN HYDROCHLORIDE 0.4 MG/1
1 CAPSULE ORAL DAILY
Qty: 90 CAPSULE | Refills: 0 | Status: SHIPPED | OUTPATIENT
Start: 2024-07-29

## 2024-09-12 ENCOUNTER — OFFICE VISIT (OUTPATIENT)
Dept: FAMILY MEDICINE CLINIC | Facility: CLINIC | Age: 69
End: 2024-09-12
Payer: MEDICARE

## 2024-09-12 VITALS
DIASTOLIC BLOOD PRESSURE: 60 MMHG | WEIGHT: 160 LBS | HEART RATE: 73 BPM | OXYGEN SATURATION: 95 % | SYSTOLIC BLOOD PRESSURE: 100 MMHG | BODY MASS INDEX: 25.82 KG/M2 | TEMPERATURE: 97.3 F

## 2024-09-12 DIAGNOSIS — Z12.11 COLON CANCER SCREENING: ICD-10-CM

## 2024-09-12 DIAGNOSIS — I10 ESSENTIAL HYPERTENSION: Primary | ICD-10-CM

## 2024-09-12 DIAGNOSIS — R06.02 SOBOE (SHORTNESS OF BREATH ON EXERTION): ICD-10-CM

## 2024-09-12 DIAGNOSIS — R07.9 CHEST PAIN, UNSPECIFIED TYPE: ICD-10-CM

## 2024-09-12 DIAGNOSIS — Z23 NEED FOR INFLUENZA VACCINATION: ICD-10-CM

## 2024-09-12 DIAGNOSIS — Z12.5 PROSTATE CANCER SCREENING: ICD-10-CM

## 2024-09-12 DIAGNOSIS — Z12.2 SCREENING FOR LUNG CANCER: ICD-10-CM

## 2024-09-12 DIAGNOSIS — Z87.891 FORMER SMOKER: ICD-10-CM

## 2024-09-12 DIAGNOSIS — R09.89 LABILE BLOOD PRESSURE: ICD-10-CM

## 2024-09-12 LAB
ALBUMIN SERPL-MCNC: 4.2 G/DL (ref 3.5–5.2)
ALBUMIN/GLOB SERPL: 1.6 G/DL
ALP SERPL-CCNC: 69 U/L (ref 39–117)
ALT SERPL W P-5'-P-CCNC: 23 U/L (ref 1–41)
ANION GAP SERPL CALCULATED.3IONS-SCNC: 9.2 MMOL/L (ref 5–15)
AST SERPL-CCNC: 18 U/L (ref 1–40)
BASOPHILS # BLD AUTO: 0.08 10*3/MM3 (ref 0–0.2)
BASOPHILS NFR BLD AUTO: 0.9 % (ref 0–1.5)
BILIRUB SERPL-MCNC: 0.6 MG/DL (ref 0–1.2)
BUN SERPL-MCNC: 23 MG/DL (ref 8–23)
BUN/CREAT SERPL: 20.7 (ref 7–25)
CALCIUM SPEC-SCNC: 9.7 MG/DL (ref 8.6–10.5)
CHLORIDE SERPL-SCNC: 105 MMOL/L (ref 98–107)
CHOLEST SERPL-MCNC: 127 MG/DL (ref 0–200)
CO2 SERPL-SCNC: 26.8 MMOL/L (ref 22–29)
CREAT SERPL-MCNC: 1.11 MG/DL (ref 0.76–1.27)
DEPRECATED RDW RBC AUTO: 43.3 FL (ref 37–54)
EGFRCR SERPLBLD CKD-EPI 2021: 71 ML/MIN/1.73
EOSINOPHIL # BLD AUTO: 0.21 10*3/MM3 (ref 0–0.4)
EOSINOPHIL NFR BLD AUTO: 2.4 % (ref 0.3–6.2)
ERYTHROCYTE [DISTWIDTH] IN BLOOD BY AUTOMATED COUNT: 13.4 % (ref 12.3–15.4)
GLOBULIN UR ELPH-MCNC: 2.6 GM/DL
GLUCOSE SERPL-MCNC: 96 MG/DL (ref 65–99)
HCT VFR BLD AUTO: 46.1 % (ref 37.5–51)
HDLC SERPL-MCNC: 41 MG/DL (ref 40–60)
HGB BLD-MCNC: 15.8 G/DL (ref 13–17.7)
IMM GRANULOCYTES # BLD AUTO: 0.04 10*3/MM3 (ref 0–0.05)
IMM GRANULOCYTES NFR BLD AUTO: 0.5 % (ref 0–0.5)
LDLC SERPL CALC-MCNC: 69 MG/DL (ref 0–100)
LDLC/HDLC SERPL: 1.65 {RATIO}
LYMPHOCYTES # BLD AUTO: 2.27 10*3/MM3 (ref 0.7–3.1)
LYMPHOCYTES NFR BLD AUTO: 25.9 % (ref 19.6–45.3)
MAGNESIUM SERPL-MCNC: 2.4 MG/DL (ref 1.6–2.4)
MCH RBC QN AUTO: 30.4 PG (ref 26.6–33)
MCHC RBC AUTO-ENTMCNC: 34.3 G/DL (ref 31.5–35.7)
MCV RBC AUTO: 88.7 FL (ref 79–97)
MONOCYTES # BLD AUTO: 0.54 10*3/MM3 (ref 0.1–0.9)
MONOCYTES NFR BLD AUTO: 6.2 % (ref 5–12)
NEUTROPHILS NFR BLD AUTO: 5.63 10*3/MM3 (ref 1.7–7)
NEUTROPHILS NFR BLD AUTO: 64.1 % (ref 42.7–76)
NRBC BLD AUTO-RTO: 0 /100 WBC (ref 0–0.2)
PLATELET # BLD AUTO: 291 10*3/MM3 (ref 140–450)
PMV BLD AUTO: 10.8 FL (ref 6–12)
POTASSIUM SERPL-SCNC: 3.9 MMOL/L (ref 3.5–5.2)
PROT SERPL-MCNC: 6.8 G/DL (ref 6–8.5)
PSA SERPL-MCNC: 1.49 NG/ML (ref 0–4)
RBC # BLD AUTO: 5.2 10*6/MM3 (ref 4.14–5.8)
SODIUM SERPL-SCNC: 141 MMOL/L (ref 136–145)
T4 FREE SERPL-MCNC: 1.25 NG/DL (ref 0.92–1.68)
TRIGL SERPL-MCNC: 91 MG/DL (ref 0–150)
TSH SERPL DL<=0.05 MIU/L-ACNC: 3.61 UIU/ML (ref 0.27–4.2)
VLDLC SERPL-MCNC: 17 MG/DL (ref 5–40)
WBC NRBC COR # BLD AUTO: 8.77 10*3/MM3 (ref 3.4–10.8)

## 2024-09-12 PROCEDURE — 85025 COMPLETE CBC W/AUTO DIFF WBC: CPT | Performed by: NURSE PRACTITIONER

## 2024-09-12 PROCEDURE — 84439 ASSAY OF FREE THYROXINE: CPT | Performed by: NURSE PRACTITIONER

## 2024-09-12 PROCEDURE — G0103 PSA SCREENING: HCPCS | Performed by: NURSE PRACTITIONER

## 2024-09-12 PROCEDURE — 80061 LIPID PANEL: CPT | Performed by: NURSE PRACTITIONER

## 2024-09-12 PROCEDURE — 83735 ASSAY OF MAGNESIUM: CPT | Performed by: NURSE PRACTITIONER

## 2024-09-12 PROCEDURE — 84443 ASSAY THYROID STIM HORMONE: CPT | Performed by: NURSE PRACTITIONER

## 2024-09-12 PROCEDURE — 80053 COMPREHEN METABOLIC PANEL: CPT | Performed by: NURSE PRACTITIONER

## 2024-09-12 RX ORDER — NITROGLYCERIN 0.4 MG/1
1 TABLET SUBLINGUAL
COMMUNITY
Start: 2024-09-09 | End: 2024-09-12

## 2024-09-12 NOTE — PROGRESS NOTES
Chief Complaint  Hypertension (Follow up )    History of Present Illness  Guillaume Rice is a 69 y.o. male who presents to [facility] with a past medical history of    Past Medical History:   Diagnosis Date    Abnormal ECG     Arthritis 2000    BPH (benign prostatic hyperplasia)     Cataract     CHF (congestive heart failure) 05/23/2022    Clotting disorder     Coronary artery disease     Hyperlipidemia     Hypertension     Myocardial infarction     Pulmonary arterial hypertension      Mr. Rice presents to the office today to follow up on his blood pressure - he states it has been bouncing up and down over the past few weeks. 2 weeks ago, when it was really hot, he started to feel funny after being outside 'piddling' - got dizzy and could not breathe. He took his blood pressure and it was 170/96 and HR was 130. His machine went off and told him that he had an erratic heart beat. He sat in his recliner and took a nitro and he still had chest tightness - like someone was sitting on him. He took another nitro and went to bed and 15 minutes later he felt better and his blood pressure went back to normal. His diastolic is generally 'high' around 70-80. His systolic is usually 125.     He did see his heart doctor on Monday - he states he does not know what brought it on. He has his scheduled for an echo. Dr. Monroy did an EKG in the office and told him it was unchanged. His echo is scheduled for next month.     Hx of hypertension, hyperlipidemia and multivessel CAD status post PCI to the LAD (3/2024) and RCA (4/2024). He states he has 6 stents - 3 put in in April 2024. He was unable to complete cardiac rehabilitation following stent placement due to cost. He is able to swim (swims in his pool at home occasionally) and walk 2 miles per day without symptoms, except when climbing stairs or hills. He is short of breath going up and down hills or stairs. They walk [place]  frequently.     He used to be a smoker - quit 12 years ago. He has not had lung cancer screening. He started smoking around age 14-16 and smoked 2 PPD up until he quit 12 years ago. He denies any coughing, wheezing, or abnormal sputum production.       Objective   Vital Signs:   Vitals:    09/12/24 1043   BP: 100/60   BP Location: Left arm   Patient Position: Sitting   Pulse: 73   Temp: 97.3 °F (36.3 °C)   TempSrc: Temporal   SpO2: 95%   Weight: 72.6 kg (160 lb)   PainSc: 0-No pain     Body mass index is 25.82 kg/m².    Wt Readings from Last 3 Encounters:   09/12/24 72.6 kg (160 lb)   06/18/24 74.4 kg (164 lb)   05/09/24 76.2 kg (168 lb)     BP Readings from Last 3 Encounters:   09/12/24 100/60   06/18/24 122/62   05/09/24 120/80       Health Maintenance   Topic Date Due    TDAP/TD VACCINES (1 - Tdap) Never done    ZOSTER VACCINE (1 of 2) Never done    LUNG CANCER SCREENING  Never done    COLORECTAL CANCER SCREENING  06/04/2024    COVID-19 Vaccine (4 - 2023-24 season) 09/01/2024    BMI FOLLOWUP  03/05/2025    LIPID PANEL  04/09/2025    ANNUAL WELLNESS VISIT  05/09/2025    HEPATITIS C SCREENING  Completed    INFLUENZA VACCINE  Completed    Pneumococcal Vaccine 65+  Completed    AAA SCREEN (ONE-TIME)  Completed       Physical Exam  Vitals reviewed.   Constitutional:       General: He is not in acute distress.     Appearance: Normal appearance. He is well-developed. He is not ill-appearing.   HENT:      Head: Normocephalic and atraumatic.   Eyes:      General: No scleral icterus.        Right eye: No discharge.         Left eye: No discharge.      Extraocular Movements: Extraocular movements intact.      Conjunctiva/sclera: Conjunctivae normal.      Pupils: Pupils are equal, round, and reactive to light.   Neck:      Vascular: No carotid bruit.      Trachea: Trachea normal.   Cardiovascular:      Rate and Rhythm: Normal rate and regular rhythm.      Pulses: Normal pulses.      Heart sounds: No murmur heard.  Pulmonary:       Effort: Pulmonary effort is normal.      Breath sounds: Normal breath sounds. No wheezing, rhonchi or rales.   Musculoskeletal:         General: Normal range of motion.      Cervical back: Normal range of motion and neck supple. No tenderness.      Right lower leg: No edema.      Left lower leg: No edema.   Lymphadenopathy:      Cervical: No cervical adenopathy.   Skin:     General: Skin is warm and dry.   Neurological:      Mental Status: He is alert and oriented to person, place, and time.   Psychiatric:         Mood and Affect: Mood and affect normal.         Behavior: Behavior normal.         Thought Content: Thought content normal.         Judgment: Judgment normal.         Result Review :  The following data was reviewed by: LUZ MARINA Leone on 09/12/2024:    No visits with results within 1 Month(s) from this visit.   Latest known visit with results is:   Clinical Support on 05/15/2024   Component Date Value    Vitamin B-12 05/15/2024 640     Folate 05/15/2024 5.72      Common labs          3/19/2024    06:55 4/9/2024    07:08 5/9/2024    09:31   Common Labs   WBC 10.18     10.71        Hemoglobin 16.7     16.2        Hematocrit 50.9     49.1        Platelets 303     316        Hemoglobin A1C   5.80       Details          This result is from an external source.             Hemoglobin A1c (05/09/2024 09:31)  Iron Profile (05/09/2024 09:31)  Ferritin (05/09/2024 09:31)  Vitamin B12 (05/15/2024 10:07)  Folate (05/15/2024 10:07)    09/09/2024 - office visit with Dr. Monroy        Procedures        Assessment and Plan   Diagnoses and all orders for this visit:    1. Essential hypertension (Primary)  -     CBC Auto Differential  -     Comprehensive Metabolic Panel  -     Lipid Panel  -     TSH+Free T4  -     Microalbumin / Creatinine Urine Ratio - Urine, Clean Catch    2. SOBOE (shortness of breath on exertion)  -     Magnesium  -     CK  -     CK MB  -     High Sensitivity Troponin T    3. Labile  blood pressure  -     Magnesium  -     CK  -     CK MB  -     High Sensitivity Troponin T    4. Chest pain, unspecified type  -     Magnesium  -     CK  -     CK MB  -     High Sensitivity Troponin T    5. Screening for lung cancer  -      CT Chest Low Dose Cancer Screening WO; Future    6. Former smoker  -      CT Chest Low Dose Cancer Screening WO; Future    7. Prostate cancer screening  -     PSA Screen    8. Colon cancer screening  -     Ambulatory Referral to Gastroenterology    9. Need for influenza vaccination  -     Fluzone High-Dose 65+yrs                  FOLLOW UP  No follow-ups on file.    Blood pressure is lower end of normal range on exam today.  He reports recent episode of chest pain 2 weeks ago relieved after 2 rounds of nitro.  He had follow-up with cardiology and they reported no change in EKG and ordered an echo for next month.  He has not had any recent lab work.  I will check CK, CK-MB, magnesiu, and troponin.    Monitor blood pressure at home.  Call if readings are less than 100/60 and symptomatic, or greater than 140/90 and symptomatic.  With any recurrent chest pain requiring nitroglycerin I have encouraged him to go to the emergency room.    I have recommended a CT of the chest for lung cancer screening given longstanding history of nicotine dependence.  He does not exhibit any signs or symptoms of lung cancer.  Shortness of breath could be related to cardiovascular history versus chronic lung disease, which we discussed.    Will get PSA for prostate cancer screening.    He would like to see Dr. Mayen for colorectal cancer screening.  He had previously been seen by Dr. Fowler.  He is due this year.    Will also give him his flu shot today.      Patient was given instructions and counseling regarding his condition or for health maintenance advice. Please see specific information pulled into the AVS if appropriate.       Lola Kevin, APRN  09/12/24  12:48 EDT    CURRENT & DISCONTINUED  MEDICATIONS  Current Outpatient Medications   Medication Instructions    albuterol sulfate HFA (ProAir HFA) 108 (90 Base) MCG/ACT inhaler 2 puffs, Inhalation, Every 4 Hours PRN    Aspirin Low Dose 81 MG EC tablet TAKE 1 TABLET BY MOUTH EVERY DAY THIS MEDICATION HAS BEEN SHORT FILLED TO LINE UP WITH YOUR OTHER MEDICATIONS    atorvastatin (LIPITOR) 40 mg, Oral, Daily    clopidogrel (PLAVIX) 75 mg, Oral, Daily    finasteride (PROSCAR) 5 MG tablet TAKE 1 TABLET BY MOUTH EVERY DAY    hydroCHLOROthiazide 12.5 mg, Oral, Every Morning    pantoprazole (PROTONIX) 40 mg, Oral, Daily    tamsulosin (FLOMAX) 0.4 mg, Oral, Daily       Medications Discontinued During This Encounter   Medication Reason    nitroglycerin (NITROSTAT) 0.4 MG SL tablet *Therapy completed

## 2024-09-12 NOTE — PROGRESS NOTES
Venipuncture Blood Specimen Collection  Venipuncture performed in left arm  by Candace Arias with good hemostasis. Patient tolerated the procedure well without complications.   09/12/24   Candace Arias

## 2024-09-13 LAB
ALBUMIN UR-MCNC: <1.2 MG/DL
CK MB SERPL-CCNC: 1.83 NG/ML
CK SERPL-CCNC: 80 U/L (ref 20–200)
CREAT UR-MCNC: 125.1 MG/DL
MICROALBUMIN/CREAT UR: NORMAL MG/G{CREAT}
TROPONIN T SERPL HS-MCNC: 19 NG/L

## 2024-09-13 PROCEDURE — 82553 CREATINE MB FRACTION: CPT | Performed by: NURSE PRACTITIONER

## 2024-09-13 PROCEDURE — 82570 ASSAY OF URINE CREATININE: CPT | Performed by: NURSE PRACTITIONER

## 2024-09-13 PROCEDURE — 82043 UR ALBUMIN QUANTITATIVE: CPT | Performed by: NURSE PRACTITIONER

## 2024-09-13 PROCEDURE — 82550 ASSAY OF CK (CPK): CPT | Performed by: NURSE PRACTITIONER

## 2024-09-13 PROCEDURE — 84484 ASSAY OF TROPONIN QUANT: CPT | Performed by: NURSE PRACTITIONER

## 2024-09-23 ENCOUNTER — HOSPITAL ENCOUNTER (OUTPATIENT)
Dept: CT IMAGING | Facility: HOSPITAL | Age: 69
Discharge: HOME | End: 2024-09-23
Admitting: NURSE PRACTITIONER
Payer: MEDICARE

## 2024-09-23 DIAGNOSIS — Z12.2 SCREENING FOR LUNG CANCER: ICD-10-CM

## 2024-09-23 DIAGNOSIS — Z87.891 FORMER SMOKER: ICD-10-CM

## 2024-09-23 PROCEDURE — 71271 CT THORAX LUNG CANCER SCR C-: CPT

## 2024-09-24 ENCOUNTER — TELEPHONE (OUTPATIENT)
Dept: FAMILY MEDICINE CLINIC | Facility: CLINIC | Age: 69
End: 2024-09-24

## 2024-09-24 DIAGNOSIS — Z87.891 FORMER SMOKER: ICD-10-CM

## 2024-09-24 DIAGNOSIS — R91.1 RIGHT LOWER LOBE PULMONARY NODULE: Primary | ICD-10-CM

## 2024-09-24 NOTE — TELEPHONE ENCOUNTER
Caller: Guillaume Rice    Relationship: Self    Best call back number:     [phone]       Caller requesting test results: PATIENT    What test was performed: CT-SCAN    When was the test performed: 9.23.2024    Where was the test performed: [facility]    Additional notes: PLEASE CONTACT PATIENT TO ADVISE RESULTS.

## 2024-09-25 ENCOUNTER — TELEPHONE (OUTPATIENT)
Dept: FAMILY MEDICINE CLINIC | Facility: CLINIC | Age: 69
End: 2024-09-25

## 2024-09-25 NOTE — TELEPHONE ENCOUNTER
Caller: Guillaume Rice    Relationship to patient: Self    Best call back number: [phone]    Chief complaint: FOLLOW UP AFTER RECENT CT RESULTS     Type of visit: OFFICE     Requested date: ASAP     Additional notes: PATIENT CALLED WANTING TO SCHEDULE AN APPT TO SEE PCP AFTER RECEIVING HIS RESULTS FROM HIS RECENT CT SCAN. PATIENT STATES HE STILL HAS SOME FOLLOW UP QUESTIONS AND CONCERNS AND WOULD LIKE TO ADDRESS WITH PROVIDER ASAP. [facility] AVAIL[facility] NOT UNTIL 10.21.24 AND PATIENT IS WANTING TO BE SEEN SOONER. HUB UNABLE TO ACCOMMODATE.

## 2024-09-30 ENCOUNTER — OFFICE VISIT (OUTPATIENT)
Dept: FAMILY MEDICINE CLINIC | Facility: CLINIC | Age: 69
End: 2024-09-30
Payer: MEDICARE

## 2024-09-30 VITALS
DIASTOLIC BLOOD PRESSURE: 64 MMHG | OXYGEN SATURATION: 97 % | TEMPERATURE: 97.8 F | SYSTOLIC BLOOD PRESSURE: 120 MMHG | WEIGHT: 162.1 LBS | BODY MASS INDEX: 26.16 KG/M2 | HEART RATE: 63 BPM

## 2024-09-30 DIAGNOSIS — Z86.79 HX OF CORONARY ATHEROSCLEROSIS: ICD-10-CM

## 2024-09-30 DIAGNOSIS — Z87.891 FORMER SMOKER: ICD-10-CM

## 2024-09-30 DIAGNOSIS — J43.9 PULMONARY EMPHYSEMA, UNSPECIFIED EMPHYSEMA TYPE: ICD-10-CM

## 2024-09-30 DIAGNOSIS — E27.9 ADRENAL NODULE: ICD-10-CM

## 2024-09-30 DIAGNOSIS — Z12.11 COLON CANCER SCREENING: ICD-10-CM

## 2024-09-30 DIAGNOSIS — R09.89 LABILE BLOOD PRESSURE: ICD-10-CM

## 2024-09-30 DIAGNOSIS — I10 ESSENTIAL HYPERTENSION: ICD-10-CM

## 2024-09-30 DIAGNOSIS — R06.02 SOBOE (SHORTNESS OF BREATH ON EXERTION): ICD-10-CM

## 2024-09-30 DIAGNOSIS — R91.1 RIGHT LOWER LOBE PULMONARY NODULE: Primary | ICD-10-CM

## 2024-09-30 PROCEDURE — 3074F SYST BP LT 130 MM HG: CPT | Performed by: NURSE PRACTITIONER

## 2024-09-30 PROCEDURE — G2211 COMPLEX E/M VISIT ADD ON: HCPCS | Performed by: NURSE PRACTITIONER

## 2024-09-30 PROCEDURE — 1126F AMNT PAIN NOTED NONE PRSNT: CPT | Performed by: NURSE PRACTITIONER

## 2024-09-30 PROCEDURE — 99214 OFFICE O/P EST MOD 30 MIN: CPT | Performed by: NURSE PRACTITIONER

## 2024-09-30 PROCEDURE — 1160F RVW MEDS BY RX/DR IN RCRD: CPT | Performed by: NURSE PRACTITIONER

## 2024-09-30 PROCEDURE — 1159F MED LIST DOCD IN RCRD: CPT | Performed by: NURSE PRACTITIONER

## 2024-09-30 PROCEDURE — 3078F DIAST BP <80 MM HG: CPT | Performed by: NURSE PRACTITIONER

## 2024-09-30 PROCEDURE — 82274 ASSAY TEST FOR BLOOD FECAL: CPT | Performed by: NURSE PRACTITIONER

## 2024-09-30 RX ORDER — FLUTICASONE FUROATE AND VILANTEROL 100; 25 UG/1; UG/1
1 POWDER RESPIRATORY (INHALATION)
Qty: 60 EACH | Refills: 0 | Status: SHIPPED | OUTPATIENT
Start: 2024-09-30

## 2024-09-30 NOTE — PROGRESS NOTES
Chief Complaint  Results (To go over results he had done on 9-13-24 and to go over the ct he had done on 9-23. )    History of Present Illness  Guillaume Rice is a 69 y.o. male who presents to [facility] with a past medical history of    Past Medical History:   Diagnosis Date    Abnormal ECG     Allergic     Arthritis 2000    BPH (benign prostatic hyperplasia)     Cataract     CHF (congestive heart failure) 05/23/2022    Clotting disorder     Coronary artery disease     HL (hearing loss)     Hyperlipidemia     Hypertension     Myocardial infarction     Pulmonary arterial hypertension      Mr. Rice presents to the office today to follow-up on recent labs and imaging.      On 9/12/2024 he had lab work to include CK, CK-MB, high-sensitivity troponin, urine microalbumin and creatinine, CMP, lipid profile, TSH, PSA, and magnesium.  All labs were completely unremarkable.    On 9/23/2024 he had a CT of the chest for lung cancer screening.  CT was compared to outside films, previously performed at [facility].  Per radiology interpretation there is a pulmonary nodule in the right lower lobe which seems slightly larger as compared to the prior study.  Close follow-up suggested with 6-month low-dose CT.  Changes of emphysema were noted, as well as apical scarring, coronary artery calcification and a right adrenal lesion which could relate to adenoma.    He reports being unaware of any nodule previously.  He states that he has never been told that he had emphysema, but he would like to be treated as he does endorse exertional shortness of breath.  He no longer smokes.  To his knowledge he also is unaware of any type of adrenal lesion.  Denies any previous evaluation.    Blood pressure is normotensive on exam today, but he states he is still having fluctuating blood pressure readings.  He can have a blood pressure reading as high as 160/90, and as low as 100/60.  With the  fluctuation he does endorse symptoms -including dizziness and worsening shortness of breath.  He had a follow-up with Dr. Monroy, and he is scheduled for an echocardiogram in October, but he does not feel like that will entirely explain his symptoms.    Objective   Vital Signs:   Vitals:    09/30/24 1006   BP: 120/64   BP Location: Left arm   Patient Position: Sitting   Pulse: 63   Temp: 97.8 °F (36.6 °C)   TempSrc: Temporal   SpO2: 97%   Weight: 73.5 kg (162 lb 1.6 oz)     Body mass index is 26.16 kg/m².    Wt Readings from Last 3 Encounters:   09/30/24 73.5 kg (162 lb 1.6 oz)   09/12/24 72.6 kg (160 lb)   06/18/24 74.4 kg (164 lb)     BP Readings from Last 3 Encounters:   09/30/24 120/64   09/12/24 100/60   06/18/24 122/62       Health Maintenance   Topic Date Due    TDAP/TD VACCINES (1 - Tdap) Never done    ZOSTER VACCINE (1 of 2) Never done    COLORECTAL CANCER SCREENING  06/04/2024    COVID-19 Vaccine (4 - 2023-24 season) 09/01/2024    BMI FOLLOWUP  03/05/2025    ANNUAL WELLNESS VISIT  05/09/2025    LIPID PANEL  09/12/2025    LUNG CANCER SCREENING  09/23/2025    HEPATITIS C SCREENING  Completed    INFLUENZA VACCINE  Completed    Pneumococcal Vaccine 65+  Completed    AAA SCREEN (ONE-TIME)  Completed       Physical Exam  Vitals reviewed.   Constitutional:       General: He is not in acute distress.     Appearance: He is well-developed and overweight. He is not ill-appearing.   HENT:      Head: Normocephalic and atraumatic.   Eyes:      General: No scleral icterus.        Right eye: No discharge.         Left eye: No discharge.      Extraocular Movements: Extraocular movements intact.      Conjunctiva/sclera: Conjunctivae normal.      Pupils: Pupils are equal, round, and reactive to light.   Neck:      Vascular: No carotid bruit.      Trachea: Trachea normal.   Cardiovascular:      Rate and Rhythm: Normal rate and regular rhythm.      Pulses: Normal pulses.      Heart sounds: No murmur heard.  Pulmonary:       Effort: Pulmonary effort is normal.      Breath sounds: Normal breath sounds. No wheezing, rhonchi or rales.   Musculoskeletal:         General: Normal range of motion.      Cervical back: Normal range of motion and neck supple. No tenderness.      Right lower leg: No edema.      Left lower leg: No edema.   Lymphadenopathy:      Cervical: No cervical adenopathy.   Skin:     General: Skin is warm and dry.   Neurological:      Mental Status: He is alert and oriented to person, place, and time.   Psychiatric:         Mood and Affect: Mood and affect normal.         Behavior: Behavior normal.         Thought Content: Thought content normal.         Judgment: Judgment normal.         Result Review :  The following data was reviewed by: Lola Kevin, APRN on 09/30/2024:    Office Visit on 09/12/2024   Component Date Value    WBC 09/12/2024 8.77     RBC 09/12/2024 5.20     Hemoglobin 09/12/2024 15.8     Hematocrit 09/12/2024 46.1     MCV 09/12/2024 88.7     MCH 09/12/2024 30.4     MCHC 09/12/2024 34.3     RDW 09/12/2024 13.4     RDW-SD 09/12/2024 43.3     MPV 09/12/2024 10.8     Platelets 09/12/2024 291     Neutrophil % 09/12/2024 64.1     Lymphocyte % 09/12/2024 25.9     Monocyte % 09/12/2024 6.2     Eosinophil % 09/12/2024 2.4     Basophil % 09/12/2024 0.9     Immature Grans % 09/12/2024 0.5     Neutrophils, Absolute 09/12/2024 5.63     Lymphocytes, Absolute 09/12/2024 2.27     Monocytes, Absolute 09/12/2024 0.54     Eosinophils, Absolute 09/12/2024 0.21     Basophils, Absolute 09/12/2024 0.08     Immature Grans, Absolute 09/12/2024 0.04     nRBC 09/12/2024 0.0     Glucose 09/12/2024 96     BUN 09/12/2024 23     Creatinine 09/12/2024 1.11     Sodium 09/12/2024 141     Potassium 09/12/2024 3.9     Chloride 09/12/2024 105     CO2 09/12/2024 26.8     Calcium 09/12/2024 9.7     Total Protein 09/12/2024 6.8     Albumin 09/12/2024 4.2     ALT (SGPT) 09/12/2024 23     AST (SGOT) 09/12/2024 18     Alkaline Phosphatase  09/12/2024 69     Total Bilirubin 09/12/2024 0.6     Globulin 09/12/2024 2.6     A/G Ratio 09/12/2024 1.6     BUN/Creatinine Ratio 09/12/2024 20.7     Anion Gap 09/12/2024 9.2     eGFR 09/12/2024 71.0     Total Cholesterol 09/12/2024 127     Triglycerides 09/12/2024 91     HDL Cholesterol 09/12/2024 41     LDL Cholesterol  09/12/2024 69     VLDL Cholesterol 09/12/2024 17     LDL/HDL Ratio 09/12/2024 1.65     TSH 09/12/2024 3.610     Free T4 09/12/2024 1.25     Microalbumin/Creatinine * 09/13/2024      Creatinine, Urine 09/13/2024 125.1     Microalbumin, Urine 09/13/2024 <1.2     PSA 09/12/2024 1.490     Magnesium 09/12/2024 2.4     Creatine Kinase 09/13/2024 80     CKMB 09/13/2024 1.83     HS Troponin T 09/13/2024 19      CT Chest Low Dose Cancer Screening WO    Result Date: 9/23/2024  Impression: 1.There is a pulmonary nodule in the right lower lobe which seems slightly larger as compared to prior study. Close follow-up suggested. 2.Emphysematous changes 3.Apical scarring 4.Coronary artery calcification 5.Right adrenal lesion which could relate to adenoma. Recommendation: 6 month follow up with LDCT Lung Rads Assessment: Lung-RADS L3 - Probably benign, 1-2% chance of malignancy. Electronically Signed: Reid Mclean MD  9/23/2024 1:43 PM EDT  Workstation ID: [id number]        Procedures        Assessment and Plan   Diagnoses and all orders for this visit:    1. Right lower lobe pulmonary nodule (Primary)  Comments:  LDCT follow up ordered for 6 months for surveillance.    2. Former smoker    3. Adrenal nodule  -     US Renal Limited; Future    4. Essential hypertension  -     Ambulatory Referral to Cardiology    5. Labile blood pressure  -     Ambulatory Referral to Cardiology    6. Hx of coronary atherosclerosis  -     Ambulatory Referral to Cardiology    7. Pulmonary emphysema, unspecified emphysema type  -     Fluticasone Furoate-Vilanterol (Breo Ellipta) 100-25 MCG/ACT aerosol powder ; Inhale 1 puff Daily.   Dispense: 60 each; Refill: 0    8. SOBOE (shortness of breath on exertion)  -     Fluticasone Furoate-Vilanterol (Breo Ellipta) 100-25 MCG/ACT aerosol powder ; Inhale 1 puff Daily.  Dispense: 60 each; Refill: 0    9. Colon cancer screening  -     Occult Blood X 1, Stool - Stool, Per Rectum; Future            FOLLOW UP  Return in about 4 weeks (around 10/28/2024) for Next scheduled follow up (emphysema, imaging).    Discussed most recent findings on low-dose CT of the chest for lung cancer screening.  We will follow-up the right lower lobe pulmonary nodule in 6 months with low-dose CT.  Order placed at the time results were sent to the patient.    He is unaware of any adrenal nodule previously.  We will get dedicated right renal ultrasound to further assess.    I am going to see if he can see Dr. Monroy his nurse practitioner while he awaits echocardiogram.  We did discuss doing a 24-hour ambulatory blood pressure monitoring; however, his insurance will not approve with the current diagnoses.  We also discussed increasing atorvastatin to 80 mg nightly.  LDL is currently at 69.  He will discuss this with cardiology.    I will give him a trial of Breo for the emphysema noted on CT of the chest, but also for complaints of shortness of breath on exertion not otherwise explained at this time.  He will follow-up with me in 1 month to reassess.  I did discuss with him that he will need to rinse his mouth thoroughly after use to avoid thrush.    He was arranged for colonoscopy for colorectal cancer screening; however, he states he has entirely too much going on right now thus he had to cancel the appointment.  He is agreeable to occult stool testing for colon cancer screening until next year when he can probably arrange a colonoscopy.    Patient was given instructions and counseling regarding his condition or for health maintenance advice. Please see specific information pulled into the AVS if appropriate.       Lola  FREDDIE Kevin, APRN  09/30/24  12:18 EDT    CURRENT & DISCONTINUED MEDICATIONS  Current Outpatient Medications   Medication Instructions    albuterol sulfate HFA (ProAir HFA) 108 (90 Base) MCG/ACT inhaler 2 puffs, Inhalation, Every 4 Hours PRN    Aspirin Low Dose 81 MG EC tablet TAKE 1 TABLET BY MOUTH EVERY DAY THIS MEDICATION HAS BEEN SHORT FILLED TO LINE UP WITH YOUR OTHER MEDICATIONS    atorvastatin (LIPITOR) 40 mg, Oral, Daily    clopidogrel (PLAVIX) 75 mg, Oral, Daily    finasteride (PROSCAR) 5 MG tablet TAKE 1 TABLET BY MOUTH EVERY DAY    Fluticasone Furoate-Vilanterol (Breo Ellipta) 100-25 MCG/ACT aerosol powder  1 puff, Inhalation, Daily - RT    hydroCHLOROthiazide 12.5 mg, Oral, Every Morning    pantoprazole (PROTONIX) 40 mg, Oral, Daily    tamsulosin (FLOMAX) 0.4 mg, Oral, Daily       There are no discontinued medications.

## 2024-10-11 ENCOUNTER — HOSPITAL ENCOUNTER (OUTPATIENT)
Dept: ULTRASOUND IMAGING | Facility: HOSPITAL | Age: 69
Discharge: HOME | End: 2024-10-11
Payer: MEDICARE

## 2024-10-11 DIAGNOSIS — E27.9 ADRENAL NODULE: ICD-10-CM

## 2024-10-11 PROCEDURE — 76775 US EXAM ABDO BACK WALL LIM: CPT

## 2024-11-07 ENCOUNTER — TELEPHONE (OUTPATIENT)
Dept: FAMILY MEDICINE CLINIC | Facility: CLINIC | Age: 69
End: 2024-11-07

## 2024-11-07 ENCOUNTER — OFFICE VISIT (OUTPATIENT)
Dept: FAMILY MEDICINE CLINIC | Facility: CLINIC | Age: 69
End: 2024-11-07
Payer: MEDICARE

## 2024-11-07 VITALS
TEMPERATURE: 97.1 F | BODY MASS INDEX: 26.2 KG/M2 | WEIGHT: 163 LBS | DIASTOLIC BLOOD PRESSURE: 74 MMHG | HEIGHT: 66 IN | HEART RATE: 67 BPM | OXYGEN SATURATION: 98 % | SYSTOLIC BLOOD PRESSURE: 126 MMHG

## 2024-11-07 DIAGNOSIS — R35.1 BPH ASSOCIATED WITH NOCTURIA: ICD-10-CM

## 2024-11-07 DIAGNOSIS — B34.9 NONSPECIFIC SYNDROME SUGGESTIVE OF VIRAL ILLNESS: Primary | ICD-10-CM

## 2024-11-07 DIAGNOSIS — N40.1 BPH ASSOCIATED WITH NOCTURIA: ICD-10-CM

## 2024-11-07 DIAGNOSIS — J02.9 SORE THROAT: ICD-10-CM

## 2024-11-07 DIAGNOSIS — R12 HEARTBURN: ICD-10-CM

## 2024-11-07 DIAGNOSIS — R09.81 NASAL CONGESTION: ICD-10-CM

## 2024-11-07 LAB
EXPIRATION DATE: ABNORMAL
EXPIRATION DATE: NORMAL
FLUAV AG UPPER RESP QL IA.RAPID: NOT DETECTED
FLUBV AG UPPER RESP QL IA.RAPID: NOT DETECTED
INTERNAL CONTROL: ABNORMAL
INTERNAL CONTROL: NORMAL
Lab: ABNORMAL
Lab: NORMAL
S PYO AG THROAT QL: NEGATIVE
SARS-COV-2 AG UPPER RESP QL IA.RAPID: NOT DETECTED

## 2024-11-07 PROCEDURE — 1159F MED LIST DOCD IN RCRD: CPT | Performed by: NURSE PRACTITIONER

## 2024-11-07 PROCEDURE — 87428 SARSCOV & INF VIR A&B AG IA: CPT | Performed by: NURSE PRACTITIONER

## 2024-11-07 PROCEDURE — 3074F SYST BP LT 130 MM HG: CPT | Performed by: NURSE PRACTITIONER

## 2024-11-07 PROCEDURE — 3078F DIAST BP <80 MM HG: CPT | Performed by: NURSE PRACTITIONER

## 2024-11-07 PROCEDURE — 87880 STREP A ASSAY W/OPTIC: CPT | Performed by: NURSE PRACTITIONER

## 2024-11-07 PROCEDURE — 99213 OFFICE O/P EST LOW 20 MIN: CPT | Performed by: NURSE PRACTITIONER

## 2024-11-07 PROCEDURE — 1126F AMNT PAIN NOTED NONE PRSNT: CPT | Performed by: NURSE PRACTITIONER

## 2024-11-07 PROCEDURE — 1160F RVW MEDS BY RX/DR IN RCRD: CPT | Performed by: NURSE PRACTITIONER

## 2024-11-07 RX ORDER — TAMSULOSIN HYDROCHLORIDE 0.4 MG/1
1 CAPSULE ORAL DAILY
Qty: 90 CAPSULE | Refills: 0 | Status: SHIPPED | OUTPATIENT
Start: 2024-11-07

## 2024-11-07 NOTE — TELEPHONE ENCOUNTER
Pharmacy called. Pt at pharmacy and asked if we could refill floxmax. I gave her verbal okay #[phone] since he was in today.

## 2024-11-07 NOTE — PROGRESS NOTES
"Chief Complaint  Sore Throat (Started yesterday), Nasal Congestion, and Heartburn    Sore Throat     Heartburn  He complains of a sore throat.     Guillaume Rice is a 69 y.o. male who presents to [facility] with a past medical history of    Past Medical History:   Diagnosis Date    Abnormal ECG     Allergic     Arthritis 2000    BPH (benign prostatic hyperplasia)     Cataract     CHF (congestive heart failure) 05/23/2022    Clotting disorder     Coronary artery disease     HL (hearing loss)     Hyperlipidemia     Hypertension     Myocardial infarction     Pulmonary arterial hypertension      Mr. Rice presents to the office today secondary to acute onset of sore throat, fatigue, body aches, nasal congestion, and increase in reflux symptoms.  He reports symptom onset was yesterday.  Yesterday he developed sore throat and was feeling kind of achy and tired.  He reports achiness and fatigue are somewhat improved today, but he still has a sore, dry/scratchy throat.  He has had some nasal congestion but denies any significant postnasal drip.  He states he really only has postnasal drip if he is lying in the recliner.  He takes Protonix for GERD but did have recent breakthrough heartburn over the past 24 to 48 hours of unknown etiology.  He denies cough, wheeze, shortness of breath.  He has been monitoring for fever and denies any fever.  No nausea, vomiting, or diarrhea.    Objective   Vital Signs:   Vitals:    11/07/24 1455   BP: 126/74   BP Location: Right arm   Pulse: 67   Temp: 97.1 °F (36.2 °C)   TempSrc: Temporal   SpO2: 98%   Weight: 73.9 kg (163 lb)   Height: 167.6 cm (66\")     Body mass index is 26.31 kg/m².    Wt Readings from Last 3 Encounters:   11/07/24 73.9 kg (163 lb)   09/30/24 73.5 kg (162 lb 1.6 oz)   09/12/24 72.6 kg (160 lb)     BP Readings from Last 3 Encounters:   11/07/24 126/74   09/30/24 120/64   09/12/24 100/60       Health Maintenance   Topic Date Due    " COLORECTAL CANCER SCREENING  10/01/2024    ZOSTER VACCINE (1 of 2) 11/07/2024 (Originally 6/24/2003)    COVID-19 Vaccine (4 - 2024-25 season) 11/09/2024 (Originally 9/1/2024)    TDAP/TD VACCINES (1 - Tdap) 11/28/2024 (Originally 6/24/1972)    BMI FOLLOWUP  03/05/2025    ANNUAL WELLNESS VISIT  05/09/2025    LIPID PANEL  09/12/2025    LUNG CANCER SCREENING  09/23/2025    HEPATITIS C SCREENING  Completed    INFLUENZA VACCINE  Completed    Pneumococcal Vaccine 65+  Completed    AAA SCREEN (ONE-TIME)  Completed       Physical Exam  Vitals reviewed.   Constitutional:       General: He is not in acute distress.     Appearance: He is well-developed and overweight. He is not ill-appearing.   HENT:      Head: Normocephalic and atraumatic.      Right Ear: Tympanic membrane, ear canal and external ear normal. There is no impacted cerumen.      Left Ear: Tympanic membrane, ear canal and external ear normal. There is no impacted cerumen.      Nose: Nose normal.      Mouth/Throat:      Mouth: Mucous membranes are moist.      Pharynx: Oropharynx is clear. No oropharyngeal exudate or posterior oropharyngeal erythema.   Eyes:      General: No scleral icterus.        Right eye: No discharge.         Left eye: No discharge.      Extraocular Movements: Extraocular movements intact.      Conjunctiva/sclera: Conjunctivae normal.   Neck:      Thyroid: No thyromegaly.      Vascular: No carotid bruit.      Trachea: Trachea normal.   Cardiovascular:      Rate and Rhythm: Normal rate and regular rhythm.      Pulses: Normal pulses.      Heart sounds: No murmur heard.  Pulmonary:      Effort: Pulmonary effort is normal.      Breath sounds: Normal breath sounds. No wheezing, rhonchi or rales.   Musculoskeletal:         General: Normal range of motion.      Cervical back: Normal range of motion and neck supple. No tenderness.      Right lower leg: No edema.      Left lower leg: No edema.   Skin:     General: Skin is warm and dry.   Neurological:       Mental Status: He is alert and oriented to person, place, and time.   Psychiatric:         Mood and Affect: Mood and affect normal.         Behavior: Behavior normal.         Thought Content: Thought content normal.         Judgment: Judgment normal.         Result Review :  The following data was reviewed by: LUZ MARINA Leone on 11/07/2024:    Office Visit on 11/07/2024   Component Date Value    Rapid Strep A Screen 11/07/2024 Negative     Internal Control 11/07/2024 Passed     Lot Number 11/07/2024 [id number]     Expiration Date 11/07/2024 11,302,025     SARS Antigen 11/07/2024 Not Detected (A)     Influenza A Antigen DEREK 11/07/2024 Not Detected     Influenza B Antigen DEREK 11/07/2024 Not Detected     Internal Control 11/07/2024 Passed     Lot Number 11/07/2024 [id number]     Expiration Date 11/07/2024 [id number]        Procedures        Assessment and Plan   Diagnoses and all orders for this visit:    1. Nonspecific syndrome suggestive of viral illness (Primary)    2. Sore throat  -     POCT rapid strep A    3. Nasal congestion  -     POCT SARS-CoV-2 Antigen DEREK + Flu    4. Heartburn    5. BPH associated with nocturia  -     tamsulosin (FLOMAX) 0.4 MG capsule 24 hr capsule; Take 1 capsule by mouth Daily.  Dispense: 90 capsule; Refill: 0        FOLLOW UP  Return if symptoms worsen or fail to improve in 5-7 days.    Point-of-care testing for strep, COVID-19, and influenza are negative.  His heartburn symptoms have only been present in the past 24 to 48 hours.  He can continue Protonix for now and use Tums or Pepcid or Mylanta over-the-counter if needed for breakthrough symptoms.  Consider elimination of triggering foods if he feels it is truly heartburn.  He can use Claritin if needed for nasal congestion.  Recommended that he follow-up if symptoms persist or worsen, or if new symptoms develop over the next few days.  Is difficult to ascertain the exact etiology of his symptoms since they just started in the  past 24 to 48 hours.    Addendum: After the patient left the office the pharmacy contacted the office for refill of Flomax as patient was at the pharmacy wanting the refill.  Refill sent.    Patient was given instructions and counseling regarding his condition or for health maintenance advice. Please see specific information pulled into the AVS if appropriate.       Lola Kevin, APRN  11/07/24  16:16 EST    CURRENT & DISCONTINUED MEDICATIONS  Current Outpatient Medications   Medication Instructions    Aspirin Low Dose 81 MG EC tablet TAKE 1 TABLET BY MOUTH EVERY DAY THIS MEDICATION HAS BEEN SHORT FILLED TO LINE UP WITH YOUR OTHER MEDICATIONS    atorvastatin (LIPITOR) 40 mg, Daily    clopidogrel (PLAVIX) 75 mg, Daily    finasteride (PROSCAR) 5 MG tablet TAKE 1 TABLET BY MOUTH EVERY DAY    Fluticasone Furoate-Vilanterol (Breo Ellipta) 100-25 MCG/ACT aerosol powder  1 puff, Inhalation, Daily - RT    hydroCHLOROthiazide 12.5 mg, Oral, Every Morning    pantoprazole (PROTONIX) 40 mg, Daily    tamsulosin (FLOMAX) 0.4 mg, Oral, Daily       Medications Discontinued During This Encounter   Medication Reason    albuterol sulfate HFA (ProAir HFA) 108 (90 Base) MCG/ACT inhaler *Therapy completed    tamsulosin (FLOMAX) 0.4 MG capsule 24 hr capsule Reorder

## 2024-11-09 ENCOUNTER — OFFICE VISIT (OUTPATIENT)
Dept: FAMILY MEDICINE CLINIC | Facility: CLINIC | Age: 69
End: 2024-11-09
Payer: MEDICARE

## 2024-11-09 VITALS
HEIGHT: 66 IN | DIASTOLIC BLOOD PRESSURE: 76 MMHG | SYSTOLIC BLOOD PRESSURE: 122 MMHG | BODY MASS INDEX: 26.2 KG/M2 | HEART RATE: 62 BPM | TEMPERATURE: 97.4 F | OXYGEN SATURATION: 100 % | WEIGHT: 163 LBS

## 2024-11-09 DIAGNOSIS — R09.89 SINUS SYMPTOM: Primary | ICD-10-CM

## 2024-11-09 DIAGNOSIS — R20.8 BURNING SENSATION OF FEET: ICD-10-CM

## 2024-11-09 DIAGNOSIS — R68.89 FLU-LIKE SYMPTOMS: ICD-10-CM

## 2024-11-09 DIAGNOSIS — R09.81 CONGESTION OF PARANASAL SINUS: ICD-10-CM

## 2024-11-09 LAB
EXPIRATION DATE: NORMAL
FLUAV AG UPPER RESP QL IA.RAPID: NOT DETECTED
FLUBV AG UPPER RESP QL IA.RAPID: NOT DETECTED
INTERNAL CONTROL: NORMAL
Lab: NORMAL
SARS-COV-2 AG UPPER RESP QL IA.RAPID: NOT DETECTED

## 2024-11-09 PROCEDURE — 3078F DIAST BP <80 MM HG: CPT | Performed by: NURSE PRACTITIONER

## 2024-11-09 PROCEDURE — 99214 OFFICE O/P EST MOD 30 MIN: CPT | Performed by: NURSE PRACTITIONER

## 2024-11-09 PROCEDURE — 1159F MED LIST DOCD IN RCRD: CPT | Performed by: NURSE PRACTITIONER

## 2024-11-09 PROCEDURE — 1126F AMNT PAIN NOTED NONE PRSNT: CPT | Performed by: NURSE PRACTITIONER

## 2024-11-09 PROCEDURE — 3074F SYST BP LT 130 MM HG: CPT | Performed by: NURSE PRACTITIONER

## 2024-11-09 PROCEDURE — 1160F RVW MEDS BY RX/DR IN RCRD: CPT | Performed by: NURSE PRACTITIONER

## 2024-11-09 PROCEDURE — 87428 SARSCOV & INF VIR A&B AG IA: CPT | Performed by: NURSE PRACTITIONER

## 2024-11-09 RX ORDER — ALBUTEROL SULFATE 90 UG/1
2 INHALANT RESPIRATORY (INHALATION) EVERY 6 HOURS PRN
Qty: 18 G | Refills: 0 | Status: SHIPPED | OUTPATIENT
Start: 2024-11-09

## 2024-11-09 RX ORDER — METHYLPREDNISOLONE 4 MG/1
TABLET ORAL
Qty: 21 EACH | Refills: 0 | Status: SHIPPED | OUTPATIENT
Start: 2024-11-09

## 2024-11-09 RX ORDER — BENZONATATE 100 MG/1
100 CAPSULE ORAL 3 TIMES DAILY PRN
Qty: 30 CAPSULE | Refills: 0 | Status: SHIPPED | OUTPATIENT
Start: 2024-11-09

## 2024-11-09 RX ORDER — AMOXICILLIN 875 MG/1
875 TABLET, COATED ORAL 2 TIMES DAILY
Qty: 20 TABLET | Refills: 0 | Status: SHIPPED | OUTPATIENT
Start: 2024-11-09

## 2024-11-09 RX ORDER — DULOXETIN HYDROCHLORIDE 20 MG/1
20 CAPSULE, DELAYED RELEASE ORAL DAILY
Qty: 30 CAPSULE | Refills: 0 | Status: SHIPPED | OUTPATIENT
Start: 2024-11-09

## 2024-11-09 NOTE — PROGRESS NOTES
Chief Complaint  Sore Throat (Tested 2 days ago for strep,coivd, flu all negative) and Headache    Subjective            Guillaume Rice presents to [facility]  History of Present Illness  Patient reports he just started with symptoms within about 1 day or day and a half when he saw his primary care on 11/7/2024 was negative for strep negative for flu negative for COVID patient is still having persistent symptoms of the upper respiratory sinus and he also is requesting refill on his albuterol that he had in the past    No fever no chills no nausea vomiting diarrhea and is still experiencing bodyaches and congestion sinus pressure headache    ______________________________________    And then he also mentioned that he had seen the podiatrist in the summer months and that he had given him a steroid injection and it made everything worse and he meant to say something differently the other day but he said she was busy so he did not mention it and he wanted to know about the neuropathic pain that he is experiencing the burning and tingling of his feet and even has to take his shoes off because they are so sensitive-and I did explain to him that normally you would take this up with your PCP but since he is having neuropathic pain and burning tingling sensitivity of the feet we could start him on a nonnarcotic like the lowest dose of Cymbalta and then he would need to follow-up with his primary care he denies any history of SI/HI and I did explain to him that it is for anxiety depression neuropathic pain osteoarthritis pain and diabetic neuropathy pain and he verbalized understanding      PHQ-2 Total Score:    PHQ-9 Total Score:      Past Medical History:   Diagnosis Date    Abnormal ECG     Allergic     Arthritis 2000    BPH (benign prostatic hyperplasia)     Cataract     CHF (congestive heart failure) 05/23/2022    Clotting disorder     Coronary artery disease     HL (hearing loss)      Hyperlipidemia     Hypertension     Myocardial infarction     Pulmonary arterial hypertension        No Known Allergies     Past Surgical History:   Procedure Laterality Date    CARDIAC CATHETERIZATION      COLONOSCOPY  2017    CORONARY STENT PLACEMENT  2022        Social History     Tobacco Use    Smoking status: Former     Current packs/day: 0.00     Average packs/day: 2.0 packs/day for 44.5 years (89.0 ttl pk-yrs)     Types: Cigarettes     Start date: 1968     Quit date: 2012     Years since quittin.3    Smokeless tobacco: Never    Tobacco comments:     1 to 2 ppd    Vaping Use    Vaping status: Never Used   Substance Use Topics    Alcohol use: Yes     Alcohol/week: 4.0 standard drinks of alcohol     Types: 4 Cans of beer per week     Comment: usually has a drink in the summer when mowing    Drug use: Not Currently       Family History   Problem Relation Age of Onset    Hypertension Mother             Hyperlipidemia Mother             Hypertension Father     Hyperlipidemia Father     Heart disease Maternal Uncle     Heart disease Brother         Health Maintenance Due   Topic Date Due    ZOSTER VACCINE (1 of 2) Never done    COLORECTAL CANCER SCREENING  10/01/2024        Current Outpatient Medications on File Prior to Visit   Medication Sig    Aspirin Low Dose 81 MG EC tablet TAKE 1 TABLET BY MOUTH EVERY DAY THIS MEDICATION HAS BEEN SHORT FILLED TO LINE UP WITH YOUR OTHER MEDICATIONS    atorvastatin (LIPITOR) 40 MG tablet Take 1 tablet by mouth Daily.    clopidogrel (PLAVIX) 75 MG tablet Take 1 tablet by mouth Daily.    finasteride (PROSCAR) 5 MG tablet TAKE 1 TABLET BY MOUTH EVERY DAY    Fluticasone Furoate-Vilanterol (Breo Ellipta) 100-25 MCG/ACT aerosol powder  Inhale 1 puff Daily.    hydroCHLOROthiazide 12.5 MG tablet TAKE 1 TABLET BY MOUTH EVERY MORNING    pantoprazole (PROTONIX) 40 MG EC tablet Take 1 tablet by mouth Daily.    tamsulosin (FLOMAX) 0.4 MG capsule 24 hr  "capsule Take 1 capsule by mouth Daily.     No current facility-administered medications on file prior to visit.       Immunization History   Administered Date(s) Administered    COVID-19 (MODERNA) 1st,2nd,3rd Dose Monovalent 03/10/2021, 04/07/2021, 10/25/2021    Fluzone  >6mos 01/23/2017    Fluzone High-Dose 65+YRS 12/10/2019, 09/12/2024    Fluzone High-Dose 65+yrs 10/26/2022    Pneumococcal Conjugate 13-Valent (PCV13) 01/23/2017, 01/23/2017    Pneumococcal Polysaccharide (PPSV23) 10/26/2022       Review of Systems   Constitutional:  Positive for fatigue. Negative for chills and fever.   HENT:  Positive for congestion, postnasal drip, rhinorrhea, sinus pressure and sore throat.    Respiratory:  Positive for cough. Negative for shortness of breath.    Cardiovascular:  Negative for chest pain.   Gastrointestinal:  Negative for diarrhea, nausea and vomiting.   Musculoskeletal:  Positive for arthralgias.        Achyness --- also expressed about the burning tingling sensation of the feet and very sensitive   Neurological:  Positive for headache.        Objective     /76 (BP Location: Right arm)   Pulse 62   Temp 97.4 °F (36.3 °C) (Temporal)   Ht 167.6 cm (66\")   Wt 73.9 kg (163 lb)   SpO2 100%   BMI 26.31 kg/m²       Physical Exam  Vitals and nursing note reviewed.   Constitutional:       Appearance: Normal appearance.   HENT:      Head: Normocephalic.      Right Ear: Tympanic membrane, ear canal and external ear normal.      Left Ear: Tympanic membrane, ear canal and external ear normal.      Nose: Nose normal.      Right Sinus: Maxillary sinus tenderness present. No frontal sinus tenderness.      Left Sinus: Maxillary sinus tenderness present. No frontal sinus tenderness.      Mouth/Throat:      Mouth: Mucous membranes are moist.      Pharynx: No oropharyngeal exudate or posterior oropharyngeal erythema.   Eyes:      Pupils: Pupils are equal, round, and reactive to light.   Cardiovascular:      Rate and " Rhythm: Normal rate and regular rhythm.      Heart sounds: Normal heart sounds.   Pulmonary:      Effort: Pulmonary effort is normal.      Breath sounds: Normal breath sounds.   Abdominal:      Palpations: Abdomen is soft.   Musculoskeletal:         General: Normal range of motion.      Cervical back: Normal range of motion and neck supple.      Comments: Reported burning tingling and sensitivity of both feet been going on for months now and saw podiatry and the injection did not help   Skin:     General: Skin is warm and dry.   Neurological:      Mental Status: He is alert and oriented to person, place, and time.   Psychiatric:         Mood and Affect: Mood normal.         Behavior: Behavior normal.         Thought Content: Thought content normal.         Judgment: Judgment normal.         Result Review :     The following data was reviewed by: LUZ MARINA Ortega on 11/09/2024:                   POCT SARS-CoV-2 Antigen DEREK + Flu (11/07/2024 15:45)  POCT rapid strep A (11/07/2024 15:45)   POCT SARS-CoV-2 Antigen DEREK + Flu (11/09/2024 09:29)   Assessment and Plan      Diagnoses and all orders for this visit:    1. Sinus symptom (Primary)  -     POCT SARS-CoV-2 Antigen DEREK + Flu  -     albuterol sulfate  (90 Base) MCG/ACT inhaler; Inhale 2 puffs Every 6 (Six) Hours As Needed for Wheezing or Shortness of Air.  Dispense: 18 g; Refill: 0  -     methylPREDNISolone (MEDROL) 4 MG dose pack; Take as directed on package instructions.  Dispense: 21 each; Refill: 0  -     amoxicillin (AMOXIL) 875 MG tablet; Take 1 tablet by mouth 2 (Two) Times a Day.  Dispense: 20 tablet; Refill: 0  -     benzonatate (Tessalon Perles) 100 MG capsule; Take 1 capsule by mouth 3 (Three) Times a Day As Needed for Cough.  Dispense: 30 capsule; Refill: 0    2. Congestion of paranasal sinus  -     albuterol sulfate  (90 Base) MCG/ACT inhaler; Inhale 2 puffs Every 6 (Six) Hours As Needed for Wheezing or Shortness of Air.  Dispense:  18 g; Refill: 0  -     methylPREDNISolone (MEDROL) 4 MG dose pack; Take as directed on package instructions.  Dispense: 21 each; Refill: 0  -     amoxicillin (AMOXIL) 875 MG tablet; Take 1 tablet by mouth 2 (Two) Times a Day.  Dispense: 20 tablet; Refill: 0  -     benzonatate (Tessalon Perles) 100 MG capsule; Take 1 capsule by mouth 3 (Three) Times a Day As Needed for Cough.  Dispense: 30 capsule; Refill: 0    3. Flu-like symptoms  -     POCT SARS-CoV-2 Antigen DEREK + Flu  -     Cancel: COVID-19, FLU A/B, RSV PCR 1 HR TAT - Swab, Nasopharynx; Future  -     albuterol sulfate  (90 Base) MCG/ACT inhaler; Inhale 2 puffs Every 6 (Six) Hours As Needed for Wheezing or Shortness of Air.  Dispense: 18 g; Refill: 0  -     methylPREDNISolone (MEDROL) 4 MG dose pack; Take as directed on package instructions.  Dispense: 21 each; Refill: 0  -     amoxicillin (AMOXIL) 875 MG tablet; Take 1 tablet by mouth 2 (Two) Times a Day.  Dispense: 20 tablet; Refill: 0  -     benzonatate (Tessalon Perles) 100 MG capsule; Take 1 capsule by mouth 3 (Three) Times a Day As Needed for Cough.  Dispense: 30 capsule; Refill: 0    4. Burning sensation of feet  -     DULoxetine (CYMBALTA) 20 MG capsule; Take 1 capsule by mouth Daily.  Dispense: 30 capsule; Refill: 0    Empiric treatment as noted above for the upper respiratory persistent symptoms and the sinusitis symptoms and he was negative again for flu and COVID--and patient is to remain well-hydrated and lungs were completely clear and oxygenation was at 100% and blood pressure normal and no tachycardia    And then as a courtesy we will go ahead and start the duloxetine 20 mg 1 tab daily for the burning and tingling and sensitivity sensation of the feet this been going on for months and I did express to him that it is actually an SNRI which also is not only for the neuropathic pain and diabetic neuropathy and osteoarthritis pain but also for anxiety and depression he verbalized understanding  denies all SI/HI and we will start him at the very lowest dose and he needs to follow-up with his PCP within 1 month and he verbalized understanding        Follow Up     Return in 4 weeks (on 12/7/2024), or if symptoms worsen or fail to improve, for Recheck F/U with his PCP .    Patient was given instructions and counseling regarding his condition or for health maintenance advice. Please see specific information pulled into the AVS if appropriate.            Bharathióscar TIERRA Rice  reports that he quit smoking about 12 years ago. His smoking use included cigarettes. He started smoking about 56 years ago. He has a 89 pack-year smoking history. He has never used smokeless tobacco. I have educated him on the risk of diseases from using tobacco products such as cancer, COPD, and heart disease.

## 2024-11-18 ENCOUNTER — OFFICE VISIT (OUTPATIENT)
Dept: FAMILY MEDICINE CLINIC | Facility: CLINIC | Age: 69
End: 2024-11-18
Payer: MEDICARE

## 2024-11-18 VITALS
BODY MASS INDEX: 25.71 KG/M2 | SYSTOLIC BLOOD PRESSURE: 124 MMHG | HEART RATE: 83 BPM | OXYGEN SATURATION: 97 % | DIASTOLIC BLOOD PRESSURE: 76 MMHG | TEMPERATURE: 97.6 F | WEIGHT: 160 LBS | HEIGHT: 66 IN

## 2024-11-18 DIAGNOSIS — R73.03 PRE-DIABETES: ICD-10-CM

## 2024-11-18 DIAGNOSIS — R20.8 BURNING SENSATION OF FEET: Primary | ICD-10-CM

## 2024-11-18 PROCEDURE — 99214 OFFICE O/P EST MOD 30 MIN: CPT | Performed by: NURSE PRACTITIONER

## 2024-11-18 PROCEDURE — 1159F MED LIST DOCD IN RCRD: CPT | Performed by: NURSE PRACTITIONER

## 2024-11-18 PROCEDURE — 1126F AMNT PAIN NOTED NONE PRSNT: CPT | Performed by: NURSE PRACTITIONER

## 2024-11-18 PROCEDURE — 1160F RVW MEDS BY RX/DR IN RCRD: CPT | Performed by: NURSE PRACTITIONER

## 2024-11-18 PROCEDURE — 3074F SYST BP LT 130 MM HG: CPT | Performed by: NURSE PRACTITIONER

## 2024-11-18 PROCEDURE — 3078F DIAST BP <80 MM HG: CPT | Performed by: NURSE PRACTITIONER

## 2024-11-18 RX ORDER — VENLAFAXINE HYDROCHLORIDE 37.5 MG/1
37.5 CAPSULE, EXTENDED RELEASE ORAL DAILY
Qty: 30 CAPSULE | Refills: 0 | Status: SHIPPED | OUTPATIENT
Start: 2024-11-18

## 2024-11-18 NOTE — PROGRESS NOTES
"Chief Complaint  Foot Burn (Follow up on both feet, has stopped taking Cymbalta)    History of Present Illness  Guillaume Rice is a 69 y.o. male who presents to [facility] with a past medical history of    Past Medical History:   Diagnosis Date    Abnormal ECG     Allergic     Arthritis 2000    BPH (benign prostatic hyperplasia)     Cataract     CHF (congestive heart failure) 05/23/2022    Clotting disorder     Coronary artery disease     HL (hearing loss)     Hyperlipidemia     Hypertension     Myocardial infarction     Pulmonary arterial hypertension      He is here today to follow up on burning sensation of his feet -     He recently saw LUZ MARINA Bear for the burning in his feet. He was prescribed Cymbalta and took three doses. With the third dose he started to have some numbness of his face on the third day, \"like I was getting drunk\". He states he can drink two beers and get a good buzz, so he really doesn't drink much at all, but it was very similar to that. Felt lightheaded.     He saw podiatry for his feet - he was diagnosed with bilateral foot pain and plantar fasciitis - he received steroid injections and was told that his pain would ana, but he states it felt like he was stepping on nails.    He states it hurts to wear socks and shoes - he states it feels like a burn and a pressure, and like his feet are being squeezed, like too tight of shoe, but he buys his shoes 1/2 size big and gets wide shoes and they still bother him. He can only buy [employer] brand.     Objective   Vital Signs:   Vitals:    11/18/24 0932   BP: 124/76   BP Location: Left arm   Pulse: 83   Temp: 97.6 °F (36.4 °C)   TempSrc: Temporal   SpO2: 97%   Weight: 72.6 kg (160 lb)   Height: 167.6 cm (66\")     Body mass index is 25.82 kg/m².    Wt Readings from Last 3 Encounters:   11/18/24 72.6 kg (160 lb)   11/09/24 73.9 kg (163 lb)   11/07/24 73.9 kg (163 lb)     BP Readings from Last 3 " Encounters:   11/18/24 124/76   11/09/24 122/76   11/07/24 126/74       Health Maintenance   Topic Date Due    COLORECTAL CANCER SCREENING  10/01/2024    ZOSTER VACCINE (1 of 2) 11/18/2024 (Originally 6/24/2003)    TDAP/TD VACCINES (1 - Tdap) 11/28/2024 (Originally 6/24/1972)    COVID-19 Vaccine (4 - 2024-25 season) 11/18/2025 (Originally 9/1/2024)    BMI FOLLOWUP  03/05/2025    ANNUAL WELLNESS VISIT  05/09/2025    LIPID PANEL  09/12/2025    LUNG CANCER SCREENING  09/23/2025    HEPATITIS C SCREENING  Completed    INFLUENZA VACCINE  Completed    Pneumococcal Vaccine 65+  Completed    AAA SCREEN (ONE-TIME)  Completed       Physical Exam  Vitals reviewed.   Constitutional:       General: He is not in acute distress.     Appearance: Normal appearance. He is well-developed. He is not ill-appearing.   HENT:      Head: Normocephalic and atraumatic.   Eyes:      General: No scleral icterus.        Right eye: No discharge.         Left eye: No discharge.      Extraocular Movements: Extraocular movements intact.      Conjunctiva/sclera: Conjunctivae normal.   Neck:      Thyroid: No thyromegaly.      Trachea: Trachea normal.   Cardiovascular:      Rate and Rhythm: Normal rate and regular rhythm.      Pulses: Normal pulses.      Heart sounds: No murmur heard.  Pulmonary:      Effort: Pulmonary effort is normal.      Breath sounds: Normal breath sounds. No wheezing, rhonchi or rales.   Musculoskeletal:         General: Normal range of motion.      Cervical back: Normal range of motion.      Right lower leg: No edema.      Left lower leg: No edema.   Skin:     General: Skin is warm and dry.   Neurological:      Mental Status: He is alert and oriented to person, place, and time.   Psychiatric:         Mood and Affect: Mood and affect normal.         Behavior: Behavior normal.         Thought Content: Thought content normal.         Judgment: Judgment normal.         Result Review :  The following data was reviewed by: Lola FRAZIER  Vessels, APRN on 11/18/2024:    Office Visit on 11/09/2024   Component Date Value    SARS Antigen 11/09/2024 Not Detected     Influenza A Antigen DEREK 11/09/2024 Not Detected     Influenza B Antigen DEREK 11/09/2024 Not Detected     Internal Control 11/09/2024 Passed     Lot Number 11/09/2024 [id number]     Expiration Date 11/09/2024 6,[id number]    Office Visit on 11/07/2024   Component Date Value    Rapid Strep A Screen 11/07/2024 Negative     Internal Control 11/07/2024 Passed     Lot Number 11/07/2024 [id number]     Expiration Date 11/07/2024 11,302,025     SARS Antigen 11/07/2024 Not Detected (A)     Influenza A Antigen DEREK 11/07/2024 Not Detected     Influenza B Antigen DEREK 11/07/2024 Not Detected     Internal Control 11/07/2024 Passed     Lot Number 11/07/2024 [id number]     Expiration Date 11/07/2024 6,[id number]      Common labs          5/9/2024    09:31 9/12/2024    11:29 9/13/2024    08:47   Common Labs   Glucose  96     BUN  23     Creatinine  1.11     Sodium  141     Potassium  3.9     Chloride  105     Calcium  9.7     Albumin  4.2     Total Bilirubin  0.6     Alkaline Phosphatase  69     AST (SGOT)  18     ALT (SGPT)  23     WBC  8.77     Hemoglobin  15.8     Hematocrit  46.1     Platelets  291     Total Cholesterol  127     Triglycerides  91     HDL Cholesterol  41     LDL Cholesterol   69     Hemoglobin A1C 5.80      Microalbumin, Urine   <1.2    PSA  1.490       Iron Profile (05/09/2024 09:31)  Ferritin (05/09/2024 09:31)  Vitamin B12 (05/15/2024 10:07)  Folate (05/15/2024 10:07)      Procedures        Assessment and Plan   Diagnoses and all orders for this visit:    1. Burning sensation of feet (Primary)  -     EMG & Nerve Conduction Test; Future  -     venlafaxine XR (Effexor XR) 37.5 MG 24 hr capsule; Take 1 capsule by mouth Daily.  Dispense: 30 capsule; Refill: 0    2. Pre-diabetes        FOLLOW UP  Return in about 4 weeks (around 12/16/2024) for Next scheduled follow up.    He does have symptoms consistent with  neuropathy.  Labs have been unrevealing in regard to iron profile with ferritin, B12, and folate.  He does have prediabetes, but not diabetes.  He saw podiatry and was diagnosed with foot pain and plantar fasciitis and given cortisone injections but they were not efficacious.  His symptoms are burning and pressure.  I will arrange for an EMG/nerve conduction study, and in the interim give him a trial of venlafaxine.  He was intolerant to duloxetine.  We did discuss a trial of gabapentin or pregabalin, but we would like to exhaust noncontrolled options first.  Amitriptyline is not advised due to cardiovascular history.    Patient was given instructions and counseling regarding his condition or for health maintenance advice. Please see specific information pulled into the AVS if appropriate.       Lola Kevin, APRN  11/18/24  09:55 EST    CURRENT & DISCONTINUED MEDICATIONS  Current Outpatient Medications   Medication Instructions    Aspirin Low Dose 81 MG EC tablet TAKE 1 TABLET BY MOUTH EVERY DAY THIS MEDICATION HAS BEEN SHORT FILLED TO LINE UP WITH YOUR OTHER MEDICATIONS    atorvastatin (LIPITOR) 40 mg, Daily    clopidogrel (PLAVIX) 75 mg, Daily    finasteride (PROSCAR) 5 MG tablet TAKE 1 TABLET BY MOUTH EVERY DAY    Fluticasone Furoate-Vilanterol (Breo Ellipta) 100-25 MCG/ACT aerosol powder  1 puff, Inhalation, Daily - RT    hydroCHLOROthiazide 12.5 mg, Oral, Every Morning    pantoprazole (PROTONIX) 40 mg, Daily    tamsulosin (FLOMAX) 0.4 mg, Oral, Daily    venlafaxine XR (EFFEXOR XR) 37.5 mg, Oral, Daily       Medications Discontinued During This Encounter   Medication Reason    albuterol sulfate  (90 Base) MCG/ACT inhaler *Therapy completed    methylPREDNISolone (MEDROL) 4 MG dose pack *Therapy completed    amoxicillin (AMOXIL) 875 MG tablet *Therapy completed    benzonatate (Tessalon Perles) 100 MG capsule *Therapy completed    DULoxetine (CYMBALTA) 20 MG capsule Side effects

## 2024-11-20 ENCOUNTER — TELEPHONE (OUTPATIENT)
Dept: FAMILY MEDICINE CLINIC | Facility: CLINIC | Age: 69
End: 2024-11-20

## 2024-11-20 NOTE — TELEPHONE ENCOUNTER
Caller: Guillaume Rice    Relationship: Self    Best call back number: [phone]    What is the best time to reach you: ANYTIME    Who are you requesting to speak with (clinical staff, provider,  specific staff member): NURSE     What was the call regarding: PATIENT IS CALLING STATING THAT THE MEDICATION THAT WAS GIVEN TO HIM ON 11/18 IS MAKING HIM SO DISORIENTED THAT HE CAN BARELY WALK.

## 2024-11-22 DIAGNOSIS — J43.9 PULMONARY EMPHYSEMA, UNSPECIFIED EMPHYSEMA TYPE: ICD-10-CM

## 2024-11-22 DIAGNOSIS — R06.02 SOBOE (SHORTNESS OF BREATH ON EXERTION): ICD-10-CM

## 2024-11-22 RX ORDER — FLUTICASONE FUROATE AND VILANTEROL TRIFENATATE 100; 25 UG/1; UG/1
1 POWDER RESPIRATORY (INHALATION) DAILY
Qty: 60 EACH | Refills: 0 | Status: SHIPPED | OUTPATIENT
Start: 2024-11-22

## 2024-12-11 ENCOUNTER — TELEPHONE (OUTPATIENT)
Dept: FAMILY MEDICINE CLINIC | Facility: CLINIC | Age: 69
End: 2024-12-11
Payer: MEDICARE

## 2024-12-11 DIAGNOSIS — R06.02 SOBOE (SHORTNESS OF BREATH ON EXERTION): ICD-10-CM

## 2024-12-11 DIAGNOSIS — N40.1 BPH ASSOCIATED WITH NOCTURIA: ICD-10-CM

## 2024-12-11 DIAGNOSIS — J43.9 PULMONARY EMPHYSEMA, UNSPECIFIED EMPHYSEMA TYPE: ICD-10-CM

## 2024-12-11 DIAGNOSIS — R35.1 BPH ASSOCIATED WITH NOCTURIA: ICD-10-CM

## 2024-12-12 RX ORDER — TAMSULOSIN HYDROCHLORIDE 0.4 MG/1
1 CAPSULE ORAL DAILY
Qty: 90 CAPSULE | Refills: 1 | Status: SHIPPED | OUTPATIENT
Start: 2024-12-12

## 2024-12-12 RX ORDER — TAMSULOSIN HYDROCHLORIDE 0.4 MG/1
1 CAPSULE ORAL DAILY
Qty: 90 CAPSULE | Refills: 0 | OUTPATIENT
Start: 2024-12-12

## 2024-12-12 RX ORDER — FINASTERIDE 5 MG/1
5 TABLET, FILM COATED ORAL DAILY
Qty: 90 TABLET | Refills: 1 | Status: SHIPPED | OUTPATIENT
Start: 2024-12-12

## 2024-12-12 RX ORDER — FLUTICASONE FUROATE AND VILANTEROL 100; 25 UG/1; UG/1
1 POWDER RESPIRATORY (INHALATION) DAILY
Qty: 180 EACH | Refills: 1 | Status: SHIPPED | OUTPATIENT
Start: 2024-12-12

## 2024-12-13 ENCOUNTER — TELEPHONE (OUTPATIENT)
Dept: FAMILY MEDICINE CLINIC | Facility: CLINIC | Age: 69
End: 2024-12-13
Payer: MEDICARE

## 2024-12-13 DIAGNOSIS — I10 ESSENTIAL HYPERTENSION: Primary | ICD-10-CM

## 2024-12-13 RX ORDER — HYDROCHLOROTHIAZIDE 12.5 MG/1
12.5 TABLET ORAL EVERY MORNING
Qty: 90 TABLET | Refills: 1 | Status: SHIPPED | OUTPATIENT
Start: 2024-12-13

## 2025-01-10 ENCOUNTER — OFFICE VISIT (OUTPATIENT)
Dept: FAMILY MEDICINE CLINIC | Facility: CLINIC | Age: 70
End: 2025-01-10
Payer: MEDICARE

## 2025-01-10 VITALS
HEART RATE: 58 BPM | DIASTOLIC BLOOD PRESSURE: 78 MMHG | BODY MASS INDEX: 26.52 KG/M2 | WEIGHT: 165 LBS | TEMPERATURE: 97.7 F | OXYGEN SATURATION: 98 % | HEIGHT: 66 IN | SYSTOLIC BLOOD PRESSURE: 130 MMHG

## 2025-01-10 DIAGNOSIS — R06.02 SOBOE (SHORTNESS OF BREATH ON EXERTION): ICD-10-CM

## 2025-01-10 DIAGNOSIS — J43.9 PULMONARY EMPHYSEMA, UNSPECIFIED EMPHYSEMA TYPE: ICD-10-CM

## 2025-01-10 DIAGNOSIS — R42 DIZZINESS: ICD-10-CM

## 2025-01-10 DIAGNOSIS — I10 PRIMARY HYPERTENSION: Primary | ICD-10-CM

## 2025-01-10 LAB
ALBUMIN SERPL-MCNC: 4.1 G/DL (ref 3.5–5.2)
ALBUMIN/GLOB SERPL: 1.5 G/DL
ALP SERPL-CCNC: 72 U/L (ref 39–117)
ALT SERPL W P-5'-P-CCNC: 28 U/L (ref 1–41)
ANION GAP SERPL CALCULATED.3IONS-SCNC: 9 MMOL/L (ref 5–15)
AST SERPL-CCNC: 20 U/L (ref 1–40)
BASOPHILS # BLD AUTO: 0.14 10*3/MM3 (ref 0–0.2)
BASOPHILS NFR BLD AUTO: 1.2 % (ref 0–1.5)
BILIRUB SERPL-MCNC: 0.5 MG/DL (ref 0–1.2)
BUN SERPL-MCNC: 17 MG/DL (ref 8–23)
BUN/CREAT SERPL: 14.5 (ref 7–25)
CALCIUM SPEC-SCNC: 9.4 MG/DL (ref 8.6–10.5)
CHLORIDE SERPL-SCNC: 102 MMOL/L (ref 98–107)
CK MB SERPL-CCNC: 1.83 NG/ML
CK SERPL-CCNC: 69 U/L (ref 20–200)
CO2 SERPL-SCNC: 29 MMOL/L (ref 22–29)
CREAT SERPL-MCNC: 1.17 MG/DL (ref 0.76–1.27)
DEPRECATED RDW RBC AUTO: 42.3 FL (ref 37–54)
EGFRCR SERPLBLD CKD-EPI 2021: 66.6 ML/MIN/1.73
EOSINOPHIL # BLD AUTO: 0.83 10*3/MM3 (ref 0–0.4)
EOSINOPHIL NFR BLD AUTO: 7.4 % (ref 0.3–6.2)
ERYTHROCYTE [DISTWIDTH] IN BLOOD BY AUTOMATED COUNT: 13.1 % (ref 12.3–15.4)
GLOBULIN UR ELPH-MCNC: 2.7 GM/DL
GLUCOSE SERPL-MCNC: 97 MG/DL (ref 65–99)
HCT VFR BLD AUTO: 48.4 % (ref 37.5–51)
HGB BLD-MCNC: 16.6 G/DL (ref 13–17.7)
IMM GRANULOCYTES # BLD AUTO: 0.04 10*3/MM3 (ref 0–0.05)
IMM GRANULOCYTES NFR BLD AUTO: 0.4 % (ref 0–0.5)
LYMPHOCYTES # BLD AUTO: 3.03 10*3/MM3 (ref 0.7–3.1)
LYMPHOCYTES NFR BLD AUTO: 27 % (ref 19.6–45.3)
MAGNESIUM SERPL-MCNC: 2.1 MG/DL (ref 1.6–2.4)
MCH RBC QN AUTO: 30.7 PG (ref 26.6–33)
MCHC RBC AUTO-ENTMCNC: 34.3 G/DL (ref 31.5–35.7)
MCV RBC AUTO: 89.5 FL (ref 79–97)
MONOCYTES # BLD AUTO: 0.65 10*3/MM3 (ref 0.1–0.9)
MONOCYTES NFR BLD AUTO: 5.8 % (ref 5–12)
NEUTROPHILS NFR BLD AUTO: 58.2 % (ref 42.7–76)
NEUTROPHILS NFR BLD AUTO: 6.52 10*3/MM3 (ref 1.7–7)
NRBC BLD AUTO-RTO: 0 /100 WBC (ref 0–0.2)
PLATELET # BLD AUTO: 316 10*3/MM3 (ref 140–450)
PMV BLD AUTO: 10.6 FL (ref 6–12)
POTASSIUM SERPL-SCNC: 3.5 MMOL/L (ref 3.5–5.2)
PROT SERPL-MCNC: 6.8 G/DL (ref 6–8.5)
RBC # BLD AUTO: 5.41 10*6/MM3 (ref 4.14–5.8)
SODIUM SERPL-SCNC: 140 MMOL/L (ref 136–145)
TROPONIN T SERPL HS-MCNC: 18 NG/L
WBC NRBC COR # BLD AUTO: 11.21 10*3/MM3 (ref 3.4–10.8)

## 2025-01-10 PROCEDURE — 82553 CREATINE MB FRACTION: CPT | Performed by: NURSE PRACTITIONER

## 2025-01-10 PROCEDURE — 99214 OFFICE O/P EST MOD 30 MIN: CPT | Performed by: NURSE PRACTITIONER

## 2025-01-10 PROCEDURE — 1126F AMNT PAIN NOTED NONE PRSNT: CPT | Performed by: NURSE PRACTITIONER

## 2025-01-10 PROCEDURE — 85025 COMPLETE CBC W/AUTO DIFF WBC: CPT | Performed by: NURSE PRACTITIONER

## 2025-01-10 PROCEDURE — 84484 ASSAY OF TROPONIN QUANT: CPT | Performed by: NURSE PRACTITIONER

## 2025-01-10 PROCEDURE — 82550 ASSAY OF CK (CPK): CPT | Performed by: NURSE PRACTITIONER

## 2025-01-10 PROCEDURE — 80053 COMPREHEN METABOLIC PANEL: CPT | Performed by: NURSE PRACTITIONER

## 2025-01-10 PROCEDURE — 3074F SYST BP LT 130 MM HG: CPT | Performed by: NURSE PRACTITIONER

## 2025-01-10 PROCEDURE — 83735 ASSAY OF MAGNESIUM: CPT | Performed by: NURSE PRACTITIONER

## 2025-01-10 PROCEDURE — 3078F DIAST BP <80 MM HG: CPT | Performed by: NURSE PRACTITIONER

## 2025-01-10 PROCEDURE — 93000 ELECTROCARDIOGRAM COMPLETE: CPT | Performed by: NURSE PRACTITIONER

## 2025-01-10 NOTE — PROGRESS NOTES
Chief Complaint  Hypertension (BP has been elevated  )    History of Present Illness  Guillaume Rice is a 69 y.o. male who presents to [facility] with a past medical history of    Past Medical History:   Diagnosis Date    Abnormal ECG     Allergic     Arthritis 2000    BPH (benign prostatic hyperplasia)     Cataract     CHF (congestive heart failure) 05/23/2022    Clotting disorder     Coronary artery disease     HL (hearing loss)     Hyperlipidemia     Hypertension     Myocardial infarction     Pulmonary arterial hypertension        History of Present Illness  The patient is a 69-year-old male who presents to the office today secondary to hypertension.    He reports experiencing elevated blood pressure readings at home, with a recent episode of dizziness prompting him to check his blood pressure, which was recorded as 194/92. He subsequently rested and rechecked his blood pressure using a different device, which indicated a reading of 192/95 and an irregular heartbeat. He does not experience any chest pain or palpitations. His heart rate remains consistently low, around 70, which he attributes to his medication regimen. He also reports persistent shortness of breath, a symptom he associates with his emphysema. He had been using Breo for approximately 2 weeks but discontinued due to throat irritation. He did rinse his mouth after using the inhaler, but still cites irritation. He also suffers from acid reflux, which was exacerbated by the inhaler. He acknowledges experiencing significant stress related to his wife's health issues. His current antihypertensive therapy includes HCTZ.    He has arthritis in his feet and is on Plavix. He can not take anti-inflammatories. He tried Tylenol Arthritis, but it did not help. He used to take ibuprofen when his hands started aching, but he was told not to take ibuprofen anymore.    He had a stool test for CRC screening 3 months ago, which came  "back negative.    MEDICATIONS  Current: HCTZ, Plavix  Discontinued: Breo      Objective   Vital Signs:   Vitals:    01/10/25 0810 01/10/25 0834 01/10/25 0835 01/10/25 0836   BP: 132/80 132/82 128/78 130/78   BP Location: Left arm Left arm Left arm Left arm   Patient Position:  Sitting Standing Lying   Pulse: 100 68 78 58   Temp: 97.7 °F (36.5 °C)      TempSrc: Temporal      SpO2: 93% 98% 97% 98%   Weight: 74.8 kg (165 lb)      Height: 167.6 cm (66\")        Body mass index is 26.63 kg/m².    Wt Readings from Last 3 Encounters:   01/10/25 74.8 kg (165 lb)   11/18/24 72.6 kg (160 lb)   11/09/24 73.9 kg (163 lb)     BP Readings from Last 3 Encounters:   01/10/25 130/78   11/18/24 124/76   11/09/24 122/76       Health Maintenance   Topic Date Due    COLORECTAL CANCER SCREENING  10/01/2024    ZOSTER VACCINE (1 of 2) 02/10/2025 (Originally 6/24/2003)    COVID-19 Vaccine (4 - 2024-25 season) 11/18/2025 (Originally 9/1/2024)    TDAP/TD VACCINES (1 - Tdap) 01/10/2026 (Originally 6/24/1972)    BMI FOLLOWUP  03/05/2025    ANNUAL WELLNESS VISIT  05/09/2025    LIPID PANEL  09/12/2025    LUNG CANCER SCREENING  09/23/2025    HEPATITIS C SCREENING  Completed    INFLUENZA VACCINE  Completed    Pneumococcal Vaccine 65+  Completed    AAA SCREEN ONCE  Completed       Physical Exam  Vitals reviewed.   Constitutional:       General: He is not in acute distress.     Appearance: He is well-developed and overweight. He is not ill-appearing.   HENT:      Head: Normocephalic and atraumatic.   Eyes:      General: No scleral icterus.        Right eye: No discharge.         Left eye: No discharge.      Extraocular Movements: Extraocular movements intact.      Conjunctiva/sclera: Conjunctivae normal.   Neck:      Thyroid: No thyromegaly.      Vascular: No carotid bruit.      Trachea: Trachea normal.   Cardiovascular:      Rate and Rhythm: Normal rate and regular rhythm.      Pulses: Normal pulses.      Heart sounds: No murmur heard.  Pulmonary:    "   Effort: Pulmonary effort is normal.      Breath sounds: Normal breath sounds. No wheezing, rhonchi or rales.   Musculoskeletal:         General: Normal range of motion.      Cervical back: Normal range of motion and neck supple. No tenderness.      Right lower leg: No edema.      Left lower leg: No edema.   Lymphadenopathy:      Cervical: No cervical adenopathy.   Skin:     General: Skin is warm and dry.   Neurological:      Mental Status: He is alert and oriented to person, place, and time.   Psychiatric:         Mood and Affect: Mood and affect normal.         Behavior: Behavior normal.         Thought Content: Thought content normal.         Judgment: Judgment normal.         Result Review :  The following data was reviewed by: LUZ MARINA Leone on 01/10/2025:    No visits with results within 1 Month(s) from this visit.   Latest known visit with results is:   Office Visit on 11/09/2024   Component Date Value    SARS Antigen 11/09/2024 Not Detected     Influenza A Antigen DEREK 11/09/2024 Not Detected     Influenza B Antigen DEREK 11/09/2024 Not Detected     Internal Control 11/09/2024 Passed     Lot Number 11/09/2024 [id number]     Expiration Date 11/09/2024 6,282,025      Common labs          5/9/2024    09:31 9/12/2024    11:29 9/13/2024    08:47   Common Labs   Glucose  96     BUN  23     Creatinine  1.11     Sodium  141     Potassium  3.9     Chloride  105     Calcium  9.7     Albumin  4.2     Total Bilirubin  0.6     Alkaline Phosphatase  69     AST (SGOT)  18     ALT (SGPT)  23     WBC  8.77     Hemoglobin  15.8     Hematocrit  46.1     Platelets  291     Total Cholesterol  127     Triglycerides  91     HDL Cholesterol  41     LDL Cholesterol   69     Hemoglobin A1C 5.80      Microalbumin, Urine   <1.2    PSA  1.490       US Renal Limited    Result Date: 10/14/2024  Impression: Right adrenal nodule better seen on recent chest CT. It measures about 2.1 cm on the current ultrasound. Adrenal nodules cannot  be characterized on ultrasound however it appears similar in size compared with the prior chest CT 2/21/2019 and previous chest CT characterize this as a lipid rich benign adrenal adenoma. Electronically Signed: Isiah Kurtz MD  10/14/2024 8:58 AM EDT  Workstation ID: [id number]    CT Chest Low Dose Cancer Screening WO    Result Date: 9/23/2024  Impression: 1.There is a pulmonary nodule in the right lower lobe which seems slightly larger as compared to prior study. Close follow-up suggested. 2.Emphysematous changes 3.Apical scarring 4.Coronary artery calcification 5.Right adrenal lesion which could relate to adenoma. Recommendation: 6 month follow up with LDCT Lung Rads Assessment: Lung-RADS L3 - Probably benign, 1-2% chance of malignancy. Electronically Signed: Reid Mclean MD  9/23/2024 1:43 PM EDT  Workstation ID: [id number]          ECG 12 Lead    Date/Time: 1/10/2025 8:53 AM  Performed by: Lola Kevin APRN    Authorized by: Lola Kevin APRN  Comparison: not compared with previous ECG   Previous ECG: no previous ECG available  Rhythm: sinus rhythm  Rate: normal  BPM: 65  QRS axis: normal  Other findings comments: nonspecific T abnormalities, lateral leads    Clinical impression: abnormal EKG             Assessment and Plan   Diagnoses and all orders for this visit:    1. Primary hypertension (Primary)  -     ECG 12 Lead  -     CBC Auto Differential  -     Comprehensive Metabolic Panel  -     Magnesium  -     CK  -     CK MB  -     High Sensitivity Troponin T    2. Dizziness  -     ECG 12 Lead  -     CBC Auto Differential  -     Comprehensive Metabolic Panel  -     Magnesium  -     CK  -     CK MB  -     High Sensitivity Troponin T    3. SOBOE (shortness of breath on exertion)  -     ECG 12 Lead  -     CBC Auto Differential  -     Comprehensive Metabolic Panel  -     Magnesium  -     CK  -     CK MB  -     High Sensitivity Troponin T    4. Pulmonary emphysema, unspecified emphysema  type        Assessment & Plan  1. Hypertension.  His blood pressure readings in the office today are within the normal range at 132/80 mmHg, but he reports elevated readings at home, including 190/92 mmHg and 192/95 mmHg, accompanied by dizziness. He is currently on HCTZ for blood pressure management. An EKG in the office today shows sinus rhythm with nonspecific T abnormalities.  We do not have an EKG for comparison.  He is currently asymptomatic.  I will obtain labs as noted above, and we will also fax his EKG to his cardiologist, Dr. Monroy.  If labs are unremarkable, and Dr. Monroy cannot discover an etiology for his symptoms, we have discussed the possibility of initiating medication to help manage stress at home.      2. Emphysema.  He reports persistent shortness of breath, which he attributes to his emphysema. He previously used an inhaler (Breo) but discontinued it due to throat irritation and worsening acid reflux.  At this time he does not wish to reinitiate medication.    3. Arthritis.  He reports arthritis in his feet and has been advised against using anti-inflammatories due to his current use of Plavix. He has tried Tylenol Arthritis without relief. He is advised to continue avoiding anti-inflammatories and may consider other pain management options if needed.    4. Health Maintenance.  He does not wish to pursue colonoscopy at this time.  He did complete fecal occult blood test for screening purposes 3 months ago and that was negative.                FOLLOW UP  Return for follow up pending outcome of labs.    Patient was given instructions and counseling regarding his condition or for health maintenance advice. Please see specific information pulled into the AVS if appropriate.       Lola Kevin, APRN  01/10/25  08:54 EST    CURRENT & DISCONTINUED MEDICATIONS  Current Outpatient Medications   Medication Instructions    Aspirin Low Dose 81 MG EC tablet TAKE 1 TABLET BY MOUTH EVERY DAY  THIS MEDICATION HAS BEEN SHORT FILLED TO LINE UP WITH YOUR OTHER MEDICATIONS    atorvastatin (LIPITOR) 40 mg, Daily    clopidogrel (PLAVIX) 75 mg, Daily    finasteride (PROSCAR) 5 mg, Oral, Daily    hydroCHLOROthiazide 12.5 mg, Oral, Every Morning    pantoprazole (PROTONIX) 40 mg, Daily    tamsulosin (FLOMAX) 0.4 mg, Oral, Daily       Medications Discontinued During This Encounter   Medication Reason    Fluticasone Furoate-Vilanterol (Breo Ellipta) 100-25 MCG/ACT aerosol powder  Patient Reported Not Taking        Patient or patient representative verbalized consent for the use of Ambient Listening during the visit with  LUZ MARINA Leone for chart documentation. 1/10/2025  08:56 EST

## 2025-01-10 NOTE — PROGRESS NOTES
..  Venipuncture Blood Specimen Collection  Venipuncture performed in LT arm by Nelli Monge MA with good hemostasis. Patient tolerated the procedure well without complications.   01/10/25   Nelli Monge MA

## 2025-01-12 DIAGNOSIS — D72.829 LEUKOCYTOSIS, UNSPECIFIED TYPE: Primary | ICD-10-CM

## 2025-02-10 ENCOUNTER — CLINICAL SUPPORT (OUTPATIENT)
Dept: FAMILY MEDICINE CLINIC | Facility: CLINIC | Age: 70
End: 2025-02-10
Payer: MEDICARE

## 2025-02-10 DIAGNOSIS — D72.829 LEUKOCYTOSIS, UNSPECIFIED TYPE: ICD-10-CM

## 2025-02-10 LAB
BASOPHILS # BLD AUTO: 0.09 10*3/MM3 (ref 0–0.2)
BASOPHILS NFR BLD AUTO: 1 % (ref 0–1.5)
DEPRECATED RDW RBC AUTO: 43 FL (ref 37–54)
EOSINOPHIL # BLD AUTO: 0.55 10*3/MM3 (ref 0–0.4)
EOSINOPHIL NFR BLD AUTO: 6 % (ref 0.3–6.2)
ERYTHROCYTE [DISTWIDTH] IN BLOOD BY AUTOMATED COUNT: 13.4 % (ref 12.3–15.4)
HCT VFR BLD AUTO: 51.2 % (ref 37.5–51)
HGB BLD-MCNC: 17.6 G/DL (ref 13–17.7)
IMM GRANULOCYTES # BLD AUTO: 0.02 10*3/MM3 (ref 0–0.05)
IMM GRANULOCYTES NFR BLD AUTO: 0.2 % (ref 0–0.5)
LYMPHOCYTES # BLD AUTO: 2.68 10*3/MM3 (ref 0.7–3.1)
LYMPHOCYTES NFR BLD AUTO: 29.3 % (ref 19.6–45.3)
MCH RBC QN AUTO: 30.6 PG (ref 26.6–33)
MCHC RBC AUTO-ENTMCNC: 34.4 G/DL (ref 31.5–35.7)
MCV RBC AUTO: 89 FL (ref 79–97)
MONOCYTES # BLD AUTO: 0.49 10*3/MM3 (ref 0.1–0.9)
MONOCYTES NFR BLD AUTO: 5.4 % (ref 5–12)
NEUTROPHILS NFR BLD AUTO: 5.32 10*3/MM3 (ref 1.7–7)
NEUTROPHILS NFR BLD AUTO: 58.1 % (ref 42.7–76)
NRBC BLD AUTO-RTO: 0 /100 WBC (ref 0–0.2)
PLATELET # BLD AUTO: 311 10*3/MM3 (ref 140–450)
PMV BLD AUTO: 10.8 FL (ref 6–12)
RBC # BLD AUTO: 5.75 10*6/MM3 (ref 4.14–5.8)
WBC NRBC COR # BLD AUTO: 9.15 10*3/MM3 (ref 3.4–10.8)

## 2025-02-10 PROCEDURE — 85025 COMPLETE CBC W/AUTO DIFF WBC: CPT | Performed by: NURSE PRACTITIONER

## 2025-02-10 PROCEDURE — 36415 COLL VENOUS BLD VENIPUNCTURE: CPT | Performed by: NURSE PRACTITIONER

## 2025-02-10 NOTE — PROGRESS NOTES
Venipuncture Blood Specimen Collection  Venipuncture performed in la by Mary Quinn MA with good hemostasis. Patient tolerated the procedure well without complications.   02/10/25   Mary Quinn MA

## 2025-03-06 ENCOUNTER — OFFICE VISIT (OUTPATIENT)
Dept: FAMILY MEDICINE CLINIC | Facility: CLINIC | Age: 70
End: 2025-03-06
Payer: MEDICARE

## 2025-03-06 VITALS
HEART RATE: 79 BPM | DIASTOLIC BLOOD PRESSURE: 67 MMHG | WEIGHT: 162 LBS | SYSTOLIC BLOOD PRESSURE: 114 MMHG | BODY MASS INDEX: 26.15 KG/M2 | OXYGEN SATURATION: 94 % | TEMPERATURE: 98 F

## 2025-03-06 DIAGNOSIS — I25.110 CORONARY ARTERY DISEASE INVOLVING NATIVE CORONARY ARTERY OF NATIVE HEART WITH UNSTABLE ANGINA PECTORIS: ICD-10-CM

## 2025-03-06 DIAGNOSIS — K21.9 GASTROESOPHAGEAL REFLUX DISEASE WITHOUT ESOPHAGITIS: ICD-10-CM

## 2025-03-06 DIAGNOSIS — B35.9 TINEA: Primary | ICD-10-CM

## 2025-03-06 DIAGNOSIS — E78.2 MIXED HYPERLIPIDEMIA: ICD-10-CM

## 2025-03-06 DIAGNOSIS — R06.02 SHORTNESS OF BREATH: ICD-10-CM

## 2025-03-06 PROCEDURE — 3078F DIAST BP <80 MM HG: CPT | Performed by: FAMILY MEDICINE

## 2025-03-06 PROCEDURE — 3074F SYST BP LT 130 MM HG: CPT | Performed by: FAMILY MEDICINE

## 2025-03-06 PROCEDURE — 99214 OFFICE O/P EST MOD 30 MIN: CPT | Performed by: FAMILY MEDICINE

## 2025-03-06 PROCEDURE — 1126F AMNT PAIN NOTED NONE PRSNT: CPT | Performed by: FAMILY MEDICINE

## 2025-03-06 RX ORDER — PANTOPRAZOLE SODIUM 40 MG/1
40 TABLET, DELAYED RELEASE ORAL DAILY
Qty: 90 TABLET | Refills: 1 | Status: SHIPPED | OUTPATIENT
Start: 2025-03-06 | End: 2025-09-02

## 2025-03-06 RX ORDER — ATORVASTATIN CALCIUM 40 MG/1
40 TABLET, FILM COATED ORAL DAILY
Qty: 90 TABLET | Refills: 1 | Status: SHIPPED | OUTPATIENT
Start: 2025-03-06 | End: 2025-09-02

## 2025-03-06 RX ORDER — ALBUTEROL SULFATE 90 UG/1
2 INHALANT RESPIRATORY (INHALATION) EVERY 6 HOURS PRN
Qty: 6.7 G | Refills: 1 | Status: SHIPPED | OUTPATIENT
Start: 2025-03-06

## 2025-03-06 RX ORDER — CLOPIDOGREL BISULFATE 75 MG/1
75 TABLET ORAL DAILY
Qty: 90 TABLET | Refills: 1 | Status: SHIPPED | OUTPATIENT
Start: 2025-03-06 | End: 2025-09-02

## 2025-03-06 RX ORDER — PRENATAL VIT 91/IRON/FOLIC/DHA 28-975-200
1 COMBINATION PACKAGE (EA) ORAL 2 TIMES DAILY
Qty: 36 G | Refills: 0 | Status: SHIPPED | OUTPATIENT
Start: 2025-03-06

## 2025-03-06 NOTE — ASSESSMENT & PLAN NOTE
Refilled PPI  Orders:    pantoprazole (PROTONIX) 40 MG EC tablet; Take 1 tablet by mouth Daily for 180 days.

## 2025-03-06 NOTE — ASSESSMENT & PLAN NOTE
Refilled statin    Orders:    atorvastatin (LIPITOR) 40 MG tablet; Take 1 tablet by mouth Daily for 180 days.

## 2025-03-06 NOTE — ASSESSMENT & PLAN NOTE
Refilled plavix    Orders:    clopidogrel (PLAVIX) 75 MG tablet; Take 1 tablet by mouth Daily for 180 days.

## 2025-03-25 ENCOUNTER — OFFICE VISIT (OUTPATIENT)
Dept: FAMILY MEDICINE CLINIC | Facility: CLINIC | Age: 70
End: 2025-03-25
Payer: MEDICARE

## 2025-03-25 ENCOUNTER — HOSPITAL ENCOUNTER (OUTPATIENT)
Dept: CT IMAGING | Facility: HOSPITAL | Age: 70
Discharge: HOME | End: 2025-03-25
Admitting: NURSE PRACTITIONER
Payer: MEDICARE

## 2025-03-25 VITALS
OXYGEN SATURATION: 97 % | HEIGHT: 66 IN | BODY MASS INDEX: 26.68 KG/M2 | SYSTOLIC BLOOD PRESSURE: 112 MMHG | TEMPERATURE: 96.8 F | WEIGHT: 166 LBS | HEART RATE: 75 BPM | DIASTOLIC BLOOD PRESSURE: 58 MMHG

## 2025-03-25 DIAGNOSIS — Z87.891 FORMER SMOKER: ICD-10-CM

## 2025-03-25 DIAGNOSIS — R21 SKIN RASH: Primary | ICD-10-CM

## 2025-03-25 DIAGNOSIS — R91.1 RIGHT LOWER LOBE PULMONARY NODULE: ICD-10-CM

## 2025-03-25 DIAGNOSIS — G45.9 TIA (TRANSIENT ISCHEMIC ATTACK): ICD-10-CM

## 2025-03-25 PROCEDURE — 71250 CT THORAX DX C-: CPT

## 2025-03-25 RX ORDER — TRIAMCINOLONE ACETONIDE 1 MG/G
1 CREAM TOPICAL 2 TIMES DAILY
Qty: 30 G | Refills: 0 | Status: SHIPPED | OUTPATIENT
Start: 2025-03-25

## 2025-03-25 NOTE — PROGRESS NOTES
"Chief Complaint  Rash (He has a Rash on his Left leg from his knee down.  You gave him some medicine but it is not work. ) and Transient Ischemic Attack (He also has had a TIA on Friday about 8am.  He didn't go to the ER like advised but he did to to the Heart doctor yesterday and they have scheduled many test and put in a referral for Neurologist. )    Subjective      Guillaume Rice is a 69 y.o. male who presents to [facility]     Follow-up for rash on left leg.  It is been there for about 3 months now.  He was initially given ketoconazole cream but that made it burn and hurt so he stopped after using it for about 10 days.  He also used a lotion for dry skin which helps to dry skin.  I saw him earlier this month on 3/6/2025 and at that time thought it was ringworm/tinea.  I sent in terbinafine cream and noted that if it had not improved, we could consider a trial of a low potency steroid cream.    Recent symptoms concerning for TIA. Happened last Friday with blurry vision, facial numbness, dizziness, lasted about 3 to 3.5 hours. He says his Neurologist called him this morning and are scheduling imaging of his brain. (He saw Cardiology yesterday and they referred him urgently to Neurology and ordered carotid duplex and 14-day Zio patch rhythm assessment along with a stress test.) He did not go to the ER. He reports symptoms have all resolved as of now.    Objective   Vital Signs:   Vitals:    03/25/25 1133   BP: 112/58   Pulse: 75   Temp: 96.8 °F (36 °C)   TempSrc: Temporal   SpO2: 97%   Weight: 75.3 kg (166 lb)   Height: 167.6 cm (66\")   PainSc: 0-No pain     Body mass index is 26.79 kg/m².    Wt Readings from Last 3 Encounters:   03/25/25 75.3 kg (166 lb)   03/06/25 73.5 kg (162 lb)   01/10/25 74.8 kg (165 lb)     BP Readings from Last 3 Encounters:   03/25/25 112/58   03/06/25 114/67   01/10/25 130/78       Health Maintenance   Topic Date Due    ZOSTER VACCINE (1 of 2) Never " done    COLORECTAL CANCER SCREENING  10/01/2024    ANNUAL WELLNESS VISIT  05/09/2025    COVID-19 Vaccine (4 - 2024-25 season) 11/18/2025 (Originally 9/1/2024)    TDAP/TD VACCINES (1 - Tdap) 01/10/2026 (Originally 6/24/1974)    LIPID PANEL  09/12/2025    LUNG CANCER SCREENING  09/23/2025    HEPATITIS C SCREENING  Completed    INFLUENZA VACCINE  Completed    Pneumococcal Vaccine 50+  Completed    AAA SCREEN ONCE  Completed       Physical Exam  Vitals and nursing note reviewed.   Constitutional:       General: He is not in acute distress.  Eyes:      Extraocular Movements: Extraocular movements intact.      Pupils: Pupils are equal, round, and reactive to light.   Cardiovascular:      Rate and Rhythm: Normal rate and regular rhythm.      Heart sounds: Normal heart sounds.   Pulmonary:      Effort: Pulmonary effort is normal. No respiratory distress.      Breath sounds: Normal breath sounds.   Skin:     Comments: Flat nontender dark rash on left ankle, pictured below   Neurological:      General: No focal deficit present.      Mental Status: He is alert and oriented to person, place, and time.      Cranial Nerves: No cranial nerve deficit.   Psychiatric:         Mood and Affect: Mood normal.         Behavior: Behavior normal.          Result Review :  The following data was reviewed by: Bernardo Ritter MD on 03/25/2025:         Procedures          Assessment & Plan  Skin rash  Will trial a steroid cream and referred him to Dermatology.  Orders:    triamcinolone (KENALOG) 0.1 % cream; Apply 1 Application topically to the appropriate area as directed 2 (Two) Times a Day.    Ambulatory Referral to Dermatology    TIA (transient ischemic attack)  He has a carotid duplex and rhythm assessment scheduled by Cardiology, and he has been referred to Neurology.     I discussed with patient the importance of going to the ER immediately (and calling an ambulance if he does not have a ) if he ever has stroke-like symptoms  like that again - quickly getting to the ER for treatment is vital for a stroke. He verbalized understanding and agreement with this plan.                      FOLLOW UP  Return if symptoms worsen or fail to improve.  Patient was given instructions and counseling regarding his condition or for health maintenance advice. Please see specific information pulled into the AVS if appropriate.       Bernardo Ritter MD  03/25/25  11:39 EDT    CURRENT & DISCONTINUED MEDICATIONS  Current Outpatient Medications   Medication Instructions    albuterol sulfate  (90 Base) MCG/ACT inhaler 2 puffs, Inhalation, Every 6 Hours PRN    Aspirin Low Dose 81 MG EC tablet TAKE 1 TABLET BY MOUTH EVERY DAY THIS MEDICATION HAS BEEN SHORT FILLED TO LINE UP WITH YOUR OTHER MEDICATIONS    atorvastatin (LIPITOR) 40 mg, Oral, Daily    clopidogrel (PLAVIX) 75 mg, Oral, Daily    finasteride (PROSCAR) 5 mg, Oral, Daily    hydroCHLOROthiazide 12.5 mg, Oral, Every Morning    pantoprazole (PROTONIX) 40 mg, Oral, Daily    tamsulosin (FLOMAX) 0.4 mg, Oral, Daily    terbinafine (lamISIL) 1 % cream 1 Application, Topical, 2 Times Daily       There are no discontinued medications.

## 2025-03-27 ENCOUNTER — TELEPHONE (OUTPATIENT)
Dept: FAMILY MEDICINE CLINIC | Facility: CLINIC | Age: 70
End: 2025-03-27
Payer: MEDICARE

## 2025-03-27 NOTE — TELEPHONE ENCOUNTER
Caller: Guillaume Rice    Relationship: Self    Best call back number: [phone]    What is the best time to reach you: ANYTIME    Who are you requesting to speak with (clinical staff, provider,  specific staff member): NURSE       What was the call regarding: PATIENT IS CALLING WANTING TO SEE HOW IMPORTANT IT IS TO DO THIS PET SCAN, HE IS SUPPOSE TO BE WEARING A HEART MINOTOR, HOWEVER HE JUST RECEIVED IT IN THE MAIL TODAY, SO IF IT IS SOMETHING THAN CAN BE DONE SOON, WE WILL HOLD OFF ON THE MONITOR.  PLEASE ADVISE.

## 2025-03-28 NOTE — TELEPHONE ENCOUNTER
Pt called back and said its the kind that has to be glued to his chest so it can't be removed. He said he has to wear it for 14 days. He has not put it on yet so he will see how soon they can get him it to have the scan done. He was transferred Mary GANT to see.

## 2025-03-28 NOTE — TELEPHONE ENCOUNTER
Called and left very detailed message.  Also told him to call and ask for the referral clerk so he get can this scheduled soon.

## 2025-04-04 ENCOUNTER — TELEPHONE (OUTPATIENT)
Dept: FAMILY MEDICINE CLINIC | Facility: CLINIC | Age: 70
End: 2025-04-04

## 2025-04-04 NOTE — TELEPHONE ENCOUNTER
Provider: LATISHA HURST     Caller: Guillaume Rice    Relationship to Patient: Self         Phone Number:   Telephone Information:   Mobile [phone]         Reason for Call:         THE PATIENT SAID HE CANCELLED HIS APPOINTMENT FOR THE PET SCAN. HE SAID HE WAS TOLD THEY DO NOT DO THE SCAN AT THE [facility] AND THEY ONLY DO THE SCAN AT THE [facility]. HE SAID HE WILL NOT GO TO THAT HOSPITAL. HE SAID HE WILL GO TO [facility].

## 2025-04-11 ENCOUNTER — HOSPITAL ENCOUNTER (OUTPATIENT)
Dept: PET IMAGING | Facility: HOSPITAL | Age: 70
Discharge: HOME | End: 2025-04-11
Payer: MEDICARE

## 2025-04-11 DIAGNOSIS — Z87.891 FORMER SMOKER: ICD-10-CM

## 2025-04-11 DIAGNOSIS — R91.1 RIGHT LOWER LOBE PULMONARY NODULE: ICD-10-CM

## 2025-04-11 LAB — GLUCOSE BLDC GLUCOMTR-MCNC: 102 MG/DL (ref 70–130)

## 2025-04-11 PROCEDURE — 78815 PET IMAGE W/CT SKULL-THIGH: CPT

## 2025-04-11 PROCEDURE — 34310000005 FLUDEOXYGLUCOSE F18 SOLUTION: Performed by: NURSE PRACTITIONER

## 2025-04-11 PROCEDURE — 82948 REAGENT STRIP/BLOOD GLUCOSE: CPT

## 2025-04-11 PROCEDURE — A9552 F18 FDG: HCPCS | Performed by: NURSE PRACTITIONER

## 2025-04-11 RX ADMIN — FLUDEOXYGLUCOSE F 18 1 DOSE: 200 INJECTION, SOLUTION INTRAVENOUS at 09:51

## 2025-04-28 ENCOUNTER — OFFICE VISIT (OUTPATIENT)
Dept: FAMILY MEDICINE CLINIC | Facility: CLINIC | Age: 70
End: 2025-04-28
Payer: MEDICARE

## 2025-04-28 VITALS
BODY MASS INDEX: 26.65 KG/M2 | HEART RATE: 70 BPM | WEIGHT: 165.1 LBS | SYSTOLIC BLOOD PRESSURE: 130 MMHG | DIASTOLIC BLOOD PRESSURE: 70 MMHG | OXYGEN SATURATION: 96 % | TEMPERATURE: 98.6 F

## 2025-04-28 DIAGNOSIS — R31.0 GROSS HEMATURIA: Primary | ICD-10-CM

## 2025-04-28 DIAGNOSIS — I10 ESSENTIAL HYPERTENSION: ICD-10-CM

## 2025-04-28 DIAGNOSIS — N40.1 BPH ASSOCIATED WITH NOCTURIA: ICD-10-CM

## 2025-04-28 DIAGNOSIS — R35.1 BPH ASSOCIATED WITH NOCTURIA: ICD-10-CM

## 2025-04-28 LAB
ALBUMIN SERPL-MCNC: 4.2 G/DL (ref 3.5–5.2)
ALBUMIN/GLOB SERPL: 1.4 G/DL
ALP SERPL-CCNC: 79 U/L (ref 39–117)
ALT SERPL W P-5'-P-CCNC: 23 U/L (ref 1–41)
ANION GAP SERPL CALCULATED.3IONS-SCNC: 11.3 MMOL/L (ref 5–15)
AST SERPL-CCNC: 20 U/L (ref 1–40)
BASOPHILS # BLD AUTO: 0.1 10*3/MM3 (ref 0–0.2)
BASOPHILS NFR BLD AUTO: 1.1 % (ref 0–1.5)
BILIRUB SERPL-MCNC: 0.7 MG/DL (ref 0–1.2)
BUN SERPL-MCNC: 16 MG/DL (ref 8–23)
BUN/CREAT SERPL: 14 (ref 7–25)
CALCIUM SPEC-SCNC: 9.8 MG/DL (ref 8.6–10.5)
CHLORIDE SERPL-SCNC: 102 MMOL/L (ref 98–107)
CK MB SERPL-CCNC: 1.83 NG/ML
CK SERPL-CCNC: 82 U/L (ref 20–200)
CO2 SERPL-SCNC: 26.7 MMOL/L (ref 22–29)
CREAT SERPL-MCNC: 1.14 MG/DL (ref 0.76–1.27)
DEPRECATED RDW RBC AUTO: 45.1 FL (ref 37–54)
EGFRCR SERPLBLD CKD-EPI 2021: 68.8 ML/MIN/1.73
EOSINOPHIL # BLD AUTO: 0.69 10*3/MM3 (ref 0–0.4)
EOSINOPHIL NFR BLD AUTO: 7.4 % (ref 0.3–6.2)
ERYTHROCYTE [DISTWIDTH] IN BLOOD BY AUTOMATED COUNT: 13.8 % (ref 12.3–15.4)
GLOBULIN UR ELPH-MCNC: 3.1 GM/DL
GLUCOSE SERPL-MCNC: 102 MG/DL (ref 65–99)
HCT VFR BLD AUTO: 51.7 % (ref 37.5–51)
HGB BLD-MCNC: 17.2 G/DL (ref 13–17.7)
IMM GRANULOCYTES # BLD AUTO: 0.03 10*3/MM3 (ref 0–0.05)
IMM GRANULOCYTES NFR BLD AUTO: 0.3 % (ref 0–0.5)
LYMPHOCYTES # BLD AUTO: 2.39 10*3/MM3 (ref 0.7–3.1)
LYMPHOCYTES NFR BLD AUTO: 25.6 % (ref 19.6–45.3)
MCH RBC QN AUTO: 30 PG (ref 26.6–33)
MCHC RBC AUTO-ENTMCNC: 33.3 G/DL (ref 31.5–35.7)
MCV RBC AUTO: 90.2 FL (ref 79–97)
MONOCYTES # BLD AUTO: 0.54 10*3/MM3 (ref 0.1–0.9)
MONOCYTES NFR BLD AUTO: 5.8 % (ref 5–12)
NEUTROPHILS NFR BLD AUTO: 5.57 10*3/MM3 (ref 1.7–7)
NEUTROPHILS NFR BLD AUTO: 59.8 % (ref 42.7–76)
NRBC BLD AUTO-RTO: 0 /100 WBC (ref 0–0.2)
PLATELET # BLD AUTO: 307 10*3/MM3 (ref 140–450)
PMV BLD AUTO: 10.9 FL (ref 6–12)
POTASSIUM SERPL-SCNC: 4.1 MMOL/L (ref 3.5–5.2)
PROT SERPL-MCNC: 7.3 G/DL (ref 6–8.5)
RBC # BLD AUTO: 5.73 10*6/MM3 (ref 4.14–5.8)
SODIUM SERPL-SCNC: 140 MMOL/L (ref 136–145)
WBC NRBC COR # BLD AUTO: 9.32 10*3/MM3 (ref 3.4–10.8)

## 2025-04-28 PROCEDURE — 87086 URINE CULTURE/COLONY COUNT: CPT | Performed by: STUDENT IN AN ORGANIZED HEALTH CARE EDUCATION/TRAINING PROGRAM

## 2025-04-28 PROCEDURE — 82553 CREATINE MB FRACTION: CPT | Performed by: STUDENT IN AN ORGANIZED HEALTH CARE EDUCATION/TRAINING PROGRAM

## 2025-04-28 PROCEDURE — 80053 COMPREHEN METABOLIC PANEL: CPT | Performed by: STUDENT IN AN ORGANIZED HEALTH CARE EDUCATION/TRAINING PROGRAM

## 2025-04-28 PROCEDURE — 85025 COMPLETE CBC W/AUTO DIFF WBC: CPT | Performed by: STUDENT IN AN ORGANIZED HEALTH CARE EDUCATION/TRAINING PROGRAM

## 2025-04-28 PROCEDURE — 82550 ASSAY OF CK (CPK): CPT | Performed by: STUDENT IN AN ORGANIZED HEALTH CARE EDUCATION/TRAINING PROGRAM

## 2025-04-28 RX ORDER — NITROGLYCERIN 0.4 MG/1
0.4 TABLET SUBLINGUAL
COMMUNITY

## 2025-04-28 RX ORDER — FINASTERIDE 5 MG/1
5 TABLET, FILM COATED ORAL DAILY
Qty: 90 TABLET | Refills: 0 | Status: SHIPPED | OUTPATIENT
Start: 2025-04-28

## 2025-04-28 RX ORDER — CIPROFLOXACIN 250 MG/1
250 TABLET, FILM COATED ORAL 2 TIMES DAILY
Qty: 14 TABLET | Refills: 0 | Status: SHIPPED | OUTPATIENT
Start: 2025-04-28 | End: 2025-05-05

## 2025-04-28 RX ORDER — HYDROCHLOROTHIAZIDE 12.5 MG/1
12.5 TABLET ORAL EVERY MORNING
Qty: 90 TABLET | Refills: 0 | Status: SHIPPED | OUTPATIENT
Start: 2025-04-28

## 2025-04-28 RX ORDER — TAMSULOSIN HYDROCHLORIDE 0.4 MG/1
1 CAPSULE ORAL DAILY
Qty: 90 CAPSULE | Refills: 0 | Status: SHIPPED | OUTPATIENT
Start: 2025-04-28

## 2025-04-28 NOTE — PROGRESS NOTES
Chief Complaint  bleeding with urination and Penis Pain    Subjective      Guillaume Rice is a 69 y.o. male who presents to [facility]       History of Present Illness  The patient is a 69-year-old male who presents for evaluation of hematuria and skin discoloration.    This morning, after urination, he had 2 episodes of leaking blood after urination.  He reports there was no blood in his urine.  He is experienced no pain during these episodes, but does note a stinging sensation at the distal end of his penis. He also reported the presence of a small blood clot in the bloody penile discharge. He has a known history of prostate issues and typically urinates once or twice daily. He reports no flank pain or fevers. He is currently on anticoagulant therapy with Plavix and a daily low-dose aspirin.     A PET scan was performed on 03/27/2025, which showed a spot on his liver. He is scheduled to have an MRI of the liver. He recently saw a cardiologist who performed several tests. About a month ago, he experienced a transient episode of blurry vision lasting approximately 3.5 hours, during which he did not completely lose his eyesight. A stroke was suspected, leading to an MRI of his head and a stress test. The stress test indicated another blockage in the same area where he previously had 2 stents placed. He has a total of 6 stents and is being considered for open heart surgery. He underwent a heart catheterization to ensure there were no blockages. During his last cath, one of his stents was found to be blocked and required intervention. He maintains a diet primarily consisting of lean deer meat, consuming beef only once a month, and avoids fast food.    He has been experiencing skin discoloration and scaliness on his leg for the past 4 months, which he has been treating with a steroid cream twice daily for the last 2 weeks. The affected area is not painful or itchy, but he expresses  concern about the discoloration. He also reports dryness and cracking of the skin on his feet.    PAST SURGICAL HISTORY:  Six stents placed in the heart.  Heart catheterization.  <<PAST SURGICAL HISTORY: N/A>>        Objective   Vital Signs:   Vitals:    04/28/25 0917   BP: 130/70   Pulse: 70   Temp: 98.6 °F (37 °C)   SpO2: 96%   Weight: 74.9 kg (165 lb 1.6 oz)     Body mass index is 26.65 kg/m².    Wt Readings from Last 3 Encounters:   04/28/25 74.9 kg (165 lb 1.6 oz)   03/25/25 75.3 kg (166 lb)   03/06/25 73.5 kg (162 lb)     BP Readings from Last 3 Encounters:   04/28/25 130/70   03/25/25 112/58   03/06/25 114/67       Health Maintenance   Topic Date Due    ZOSTER VACCINE (1 of 2) Never done    COLORECTAL CANCER SCREENING  10/01/2024    ANNUAL WELLNESS VISIT  05/09/2025    COVID-19 Vaccine (4 - 2024-25 season) 11/18/2025 (Originally 9/1/2024)    TDAP/TD VACCINES (1 - Tdap) 01/10/2026 (Originally 6/24/1974)    INFLUENZA VACCINE  07/01/2025    LIPID PANEL  09/12/2025    LUNG CANCER SCREENING  03/25/2026    HEPATITIS C SCREENING  Completed    Pneumococcal Vaccine 50+  Completed    AAA SCREEN ONCE  Completed       Physical Exam  HENT:      Head: Normocephalic and atraumatic.      Right Ear: External ear normal.      Left Ear: External ear normal.   Eyes:      Conjunctiva/sclera: Conjunctivae normal.   Cardiovascular:      Rate and Rhythm: Normal rate and regular rhythm.      Heart sounds: Normal heart sounds.   Pulmonary:      Effort: Pulmonary effort is normal.      Breath sounds: Normal breath sounds.   Musculoskeletal:         General: Normal range of motion.   Skin:     General: Skin is warm and dry.      Comments: 4 x 4 centimeter area of discoloration on the lateral left lower leg, there is no associated scaliness, dryness, or otherwise abnormality of the skin   Neurological:      Mental Status: He is alert.          Result Review :  The following data was reviewed by: Paula Paige DO on 04/28/2025:          Procedures          ASSESSMENT/PLAN  Diagnoses and all orders for this visit:    1. Gross hematuria (Primary)  -     ciprofloxacin (CIPRO) 250 MG tablet; Take 1 tablet by mouth 2 (Two) Times a Day for 7 days.  Dispense: 14 tablet; Refill: 0  -     Comprehensive Metabolic Panel  -     CBC Auto Differential  -     CK Total & CKMB  -     Urinalysis With Microscopic - Urine, Clean Catch  -     Urine Culture - Urine, Urine, Random Void          Assessment & Plan  1. Hematuria.  - The most probable cause of the hematuria is an infection, as evidenced by the stinging sensation at the distal end of the penis.  - The absence of pain during urination does not rule out the possibility of an infection in the urethra, bladder, or prostate.  - The recent PET scan did not reveal any abnormalities in the bladder or prostate, suggesting that the likelihood of cancer is low.  - A comprehensive blood panel will be ordered to assess for potential infections, muscle health, blood loss, and kidney function. He is advised to provide a urine sample for further analysis. An antibiotic regimen will be initiated to address the potential infection.    2. Skin discoloration.  - The skin discoloration could be a residual effect of a previous fungal infection, which may have resulted in melanin deposition.  - The condition is harmless and should resolve over time.  - He is advised to discontinue the use of the steroid cream unless necessary, as prolonged use could lead to skin thinning.  - If the skin becomes dry, he may apply lotion to the affected area.                 FOLLOW UP  Return if symptoms worsen or fail to improve.  Patient was given instructions and counseling regarding his condition or for health maintenance advice. Please see specific information pulled into the AVS if appropriate.       Paula Paige DO  04/28/25  10:33 EDT    Patient or patient representative verbalized consent for the use of Ambient Listening during  the visit with  Paula Paige DO for chart documentation. 4/28/2025  10:33 EDT

## 2025-04-28 NOTE — PROGRESS NOTES
Venipuncture Blood Specimen Collection  Venipuncture performed  by Candace Arias with good hemostasis. Patient tolerated the procedure well without complications.   04/28/25   Danielle Cassidy MA

## 2025-04-29 LAB — BACTERIA SPEC AEROBE CULT: NORMAL

## 2025-05-01 DIAGNOSIS — R31.0 GROSS HEMATURIA: Primary | ICD-10-CM

## 2025-05-14 NOTE — TELEPHONE ENCOUNTER
Discharge orders for Suman. Transport picking up between 10:00-11:00 today.    Kenyatta Miller, DIONNA    [phone]   He is aware

## 2025-05-28 ENCOUNTER — OFFICE VISIT (OUTPATIENT)
Dept: FAMILY MEDICINE CLINIC | Facility: CLINIC | Age: 70
End: 2025-05-28
Payer: MEDICARE

## 2025-05-28 VITALS
SYSTOLIC BLOOD PRESSURE: 112 MMHG | BODY MASS INDEX: 26.68 KG/M2 | HEIGHT: 66 IN | TEMPERATURE: 98.4 F | OXYGEN SATURATION: 94 % | WEIGHT: 166 LBS | DIASTOLIC BLOOD PRESSURE: 70 MMHG

## 2025-05-28 DIAGNOSIS — I10 ESSENTIAL HYPERTENSION: ICD-10-CM

## 2025-05-28 DIAGNOSIS — E78.2 MIXED HYPERLIPIDEMIA: ICD-10-CM

## 2025-05-28 DIAGNOSIS — I25.5 ISCHEMIC CARDIOMYOPATHY: ICD-10-CM

## 2025-05-28 DIAGNOSIS — Z00.00 MEDICARE ANNUAL WELLNESS VISIT, SUBSEQUENT: Primary | ICD-10-CM

## 2025-05-28 DIAGNOSIS — N40.1 BPH ASSOCIATED WITH NOCTURIA: ICD-10-CM

## 2025-05-28 DIAGNOSIS — Z12.11 COLON CANCER SCREENING: ICD-10-CM

## 2025-05-28 DIAGNOSIS — K21.9 GASTROESOPHAGEAL REFLUX DISEASE WITHOUT ESOPHAGITIS: ICD-10-CM

## 2025-05-28 DIAGNOSIS — K76.9 LIVER LESION, RIGHT LOBE: ICD-10-CM

## 2025-05-28 DIAGNOSIS — G45.9 TIA (TRANSIENT ISCHEMIC ATTACK): ICD-10-CM

## 2025-05-28 DIAGNOSIS — I25.110 CORONARY ARTERY DISEASE INVOLVING NATIVE CORONARY ARTERY OF NATIVE HEART WITH UNSTABLE ANGINA PECTORIS: ICD-10-CM

## 2025-05-28 DIAGNOSIS — R35.1 BPH ASSOCIATED WITH NOCTURIA: ICD-10-CM

## 2025-05-28 LAB
ALBUMIN SERPL-MCNC: 4.2 G/DL (ref 3.5–5.2)
ALBUMIN UR-MCNC: <1.2 MG/DL
ALBUMIN/GLOB SERPL: 1.5 G/DL
ALP SERPL-CCNC: 80 U/L (ref 39–117)
ALT SERPL W P-5'-P-CCNC: 27 U/L (ref 1–41)
ANION GAP SERPL CALCULATED.3IONS-SCNC: 8.5 MMOL/L (ref 5–15)
AST SERPL-CCNC: 20 U/L (ref 1–40)
BASOPHILS # BLD AUTO: 0.1 10*3/MM3 (ref 0–0.2)
BASOPHILS NFR BLD AUTO: 1.1 % (ref 0–1.5)
BILIRUB SERPL-MCNC: 0.5 MG/DL (ref 0–1.2)
BUN SERPL-MCNC: 16 MG/DL (ref 8–23)
BUN/CREAT SERPL: 13.9 (ref 7–25)
CALCIUM SPEC-SCNC: 10 MG/DL (ref 8.6–10.5)
CHLORIDE SERPL-SCNC: 103 MMOL/L (ref 98–107)
CHOLEST SERPL-MCNC: 136 MG/DL (ref 0–200)
CO2 SERPL-SCNC: 28.5 MMOL/L (ref 22–29)
CREAT SERPL-MCNC: 1.15 MG/DL (ref 0.76–1.27)
CREAT UR-MCNC: 88.9 MG/DL
DEPRECATED RDW RBC AUTO: 45.5 FL (ref 37–54)
EGFRCR SERPLBLD CKD-EPI 2021: 68 ML/MIN/1.73
EOSINOPHIL # BLD AUTO: 0.77 10*3/MM3 (ref 0–0.4)
EOSINOPHIL NFR BLD AUTO: 8.4 % (ref 0.3–6.2)
ERYTHROCYTE [DISTWIDTH] IN BLOOD BY AUTOMATED COUNT: 13.6 % (ref 12.3–15.4)
GLOBULIN UR ELPH-MCNC: 2.8 GM/DL
GLUCOSE SERPL-MCNC: 102 MG/DL (ref 65–99)
HCT VFR BLD AUTO: 49.6 % (ref 37.5–51)
HDLC SERPL-MCNC: 41 MG/DL (ref 40–60)
HGB BLD-MCNC: 16.7 G/DL (ref 13–17.7)
IMM GRANULOCYTES # BLD AUTO: 0.02 10*3/MM3 (ref 0–0.05)
IMM GRANULOCYTES NFR BLD AUTO: 0.2 % (ref 0–0.5)
LDLC SERPL CALC-MCNC: 79 MG/DL (ref 0–100)
LDLC/HDLC SERPL: 1.91 {RATIO}
LYMPHOCYTES # BLD AUTO: 2.73 10*3/MM3 (ref 0.7–3.1)
LYMPHOCYTES NFR BLD AUTO: 29.6 % (ref 19.6–45.3)
MCH RBC QN AUTO: 30.6 PG (ref 26.6–33)
MCHC RBC AUTO-ENTMCNC: 33.7 G/DL (ref 31.5–35.7)
MCV RBC AUTO: 91 FL (ref 79–97)
MICROALBUMIN/CREAT UR: NORMAL MG/G{CREAT}
MONOCYTES # BLD AUTO: 0.63 10*3/MM3 (ref 0.1–0.9)
MONOCYTES NFR BLD AUTO: 6.8 % (ref 5–12)
NEUTROPHILS NFR BLD AUTO: 4.96 10*3/MM3 (ref 1.7–7)
NEUTROPHILS NFR BLD AUTO: 53.9 % (ref 42.7–76)
NRBC BLD AUTO-RTO: 0 /100 WBC (ref 0–0.2)
PLATELET # BLD AUTO: 275 10*3/MM3 (ref 140–450)
PMV BLD AUTO: 11 FL (ref 6–12)
POTASSIUM SERPL-SCNC: 4.3 MMOL/L (ref 3.5–5.2)
PROT SERPL-MCNC: 7 G/DL (ref 6–8.5)
RBC # BLD AUTO: 5.45 10*6/MM3 (ref 4.14–5.8)
SODIUM SERPL-SCNC: 140 MMOL/L (ref 136–145)
T4 FREE SERPL-MCNC: 1.29 NG/DL (ref 0.92–1.68)
TRIGL SERPL-MCNC: 83 MG/DL (ref 0–150)
TSH SERPL DL<=0.05 MIU/L-ACNC: 6.61 UIU/ML (ref 0.27–4.2)
VLDLC SERPL-MCNC: 16 MG/DL (ref 5–40)
WBC NRBC COR # BLD AUTO: 9.21 10*3/MM3 (ref 3.4–10.8)

## 2025-05-28 PROCEDURE — 80053 COMPREHEN METABOLIC PANEL: CPT | Performed by: NURSE PRACTITIONER

## 2025-05-28 PROCEDURE — 82043 UR ALBUMIN QUANTITATIVE: CPT | Performed by: NURSE PRACTITIONER

## 2025-05-28 PROCEDURE — 85025 COMPLETE CBC W/AUTO DIFF WBC: CPT | Performed by: NURSE PRACTITIONER

## 2025-05-28 PROCEDURE — 84439 ASSAY OF FREE THYROXINE: CPT | Performed by: NURSE PRACTITIONER

## 2025-05-28 PROCEDURE — 80061 LIPID PANEL: CPT | Performed by: NURSE PRACTITIONER

## 2025-05-28 PROCEDURE — 84443 ASSAY THYROID STIM HORMONE: CPT | Performed by: NURSE PRACTITIONER

## 2025-05-28 PROCEDURE — 82570 ASSAY OF URINE CREATININE: CPT | Performed by: NURSE PRACTITIONER

## 2025-05-28 RX ORDER — ATORVASTATIN CALCIUM 40 MG/1
40 TABLET, FILM COATED ORAL DAILY
Qty: 90 TABLET | Refills: 1 | Status: SHIPPED | OUTPATIENT
Start: 2025-05-28 | End: 2025-11-24

## 2025-05-28 RX ORDER — FINASTERIDE 5 MG/1
5 TABLET, FILM COATED ORAL DAILY
Qty: 90 TABLET | Refills: 1 | Status: SHIPPED | OUTPATIENT
Start: 2025-05-28

## 2025-05-28 RX ORDER — HYDROCHLOROTHIAZIDE 12.5 MG/1
12.5 TABLET ORAL EVERY MORNING
Qty: 90 TABLET | Refills: 1 | Status: SHIPPED | OUTPATIENT
Start: 2025-05-28

## 2025-05-28 RX ORDER — TAMSULOSIN HYDROCHLORIDE 0.4 MG/1
1 CAPSULE ORAL DAILY
Qty: 90 CAPSULE | Refills: 1 | Status: SHIPPED | OUTPATIENT
Start: 2025-05-28

## 2025-05-28 RX ORDER — PANTOPRAZOLE SODIUM 40 MG/1
40 TABLET, DELAYED RELEASE ORAL DAILY
Qty: 90 TABLET | Refills: 1 | Status: SHIPPED | OUTPATIENT
Start: 2025-05-28

## 2025-05-28 NOTE — PROGRESS NOTES
..  Venipuncture Blood Specimen Collection  Venipuncture performed in Lt arm by brian Arias with good hemostasis. Patient tolerated the procedure well without complications.   05/28/25   Nelli Monge MA

## 2025-05-28 NOTE — PROGRESS NOTES
Subjective   The ABCs of the Annual Wellness Visit  Medicare Wellness Visit      Guillaume Rice is a 69 y.o. patient who presents for a Medicare Wellness Visit.    The following portions of the patient's history were reviewed and updated as appropriate: allergies, current medications, past family history, past medical history, past social history, past surgical history, and problem list.    Compared to one year ago, the patient's physical health is better.    Compared to one year ago, the patient's mental health is the same.    Recent Hospitalizations:  He was not admitted to the hospital during the last year.     Current Medical Providers:  Patient Care Team:  Lola Kevin APRN as PCP - General (Nurse Practitioner)  Carlos Eduardo Monroy MD as Cardiologist (Interventional Cardiology)  Rickey Valencia DPM as Consulting Physician (Podiatry)  Haile Lai MD as Consulting Physician (Gastroenterology)  Matti Watkins MD as Consulting Physician (Hospitalist)    Outpatient Medications Prior to Visit   Medication Sig Dispense Refill   • albuterol sulfate  (90 Base) MCG/ACT inhaler Inhale 2 puffs Every 6 (Six) Hours As Needed for Wheezing or Shortness of Air. 6.7 g 1   • Aspirin Low Dose 81 MG EC tablet TAKE 1 TABLET BY MOUTH EVERY DAY THIS MEDICATION HAS BEEN SHORT FILLED TO LINE UP WITH YOUR OTHER MEDICATIONS     • clopidogrel (PLAVIX) 75 MG tablet Take 1 tablet by mouth Daily for 180 days. 90 tablet 1   • nitroglycerin (NITROSTAT) 0.4 MG SL tablet Place 1 tablet under the tongue Every 5 (Five) Minutes As Needed for Chest Pain. Take no more than 3 doses in 15 minutes.     • atorvastatin (LIPITOR) 40 MG tablet Take 1 tablet by mouth Daily for 180 days. 90 tablet 1   • finasteride (PROSCAR) 5 MG tablet Take 1 tablet by mouth Daily. 90 tablet 0   • hydroCHLOROthiazide 12.5 MG tablet Take 1 tablet by mouth Every Morning. 90 tablet 0   • pantoprazole (PROTONIX) 40 MG EC tablet  "Take 1 tablet by mouth Daily for 180 days. 90 tablet 1   • tamsulosin (FLOMAX) 0.4 MG capsule 24 hr capsule Take 1 capsule by mouth Daily. 90 capsule 0   • terbinafine (lamISIL) 1 % cream Apply 1 Application topically to the appropriate area as directed 2 (Two) Times a Day. 36 g 0   • triamcinolone (KENALOG) 0.1 % cream Apply 1 Application topically to the appropriate area as directed 2 (Two) Times a Day. 30 g 0     No facility-administered medications prior to visit.     No opioid medication identified on active medication list. I have reviewed chart for other potential  high risk medication/s and harmful drug interactions in the elderly.      Aspirin is on active medication list. Aspirin use is indicated based on review of current medical condition/s. Pros and cons of this therapy have been discussed today. Benefits of this medication outweigh potential harm.  Patient has been encouraged to continue taking this medication.        Patient Active Problem List   Diagnosis   • BPH (benign prostatic hyperplasia)   • Cataract   • Hyperlipidemia   • Hypertension   • CORIE (obstructive sleep apnea)   • Coronary artery disease involving native coronary artery of native heart with unstable angina pectoris   • Gastroesophageal reflux disease without esophagitis   • Ischemic cardiomyopathy   • PAF (paroxysmal atrial fibrillation)   • History of heart attack   • CHF (congestive heart failure)   • Arthritis   • Prostate enlargement     Advance Care Planning Advance Directive is not on file.  ACP discussion was held with the patient during this visit. Patient has an advance directive (not in EMR), copy requested.        Objective   Vitals:    05/28/25 0809   BP: 112/70   Temp: 98.4 °F (36.9 °C)   SpO2: 94%   Weight: 75.3 kg (166 lb)   Height: 167.6 cm (66\")   PainSc: 7    PainLoc: Back       Estimated body mass index is 26.79 kg/m² as calculated from the following:    Height as of this encounter: 167.6 cm (66\").    Weight as of " this encounter: 75.3 kg (166 lb).                Does the patient have evidence of cognitive impairment? No                                                                                                Health  Risk Assessment    Smoking Status:  Social History     Tobacco Use   Smoking Status Former   • Current packs/day: 0.00   • Average packs/day: 2.0 packs/day for 44.5 years (89.0 ttl pk-yrs)   • Types: Cigarettes   • Start date: 1968   • Quit date: 2012   • Years since quittin.9   • Passive exposure: Past   Smokeless Tobacco Never   Tobacco Comments    1 to 2 ppd      Alcohol Consumption:  Social History     Substance and Sexual Activity   Alcohol Use Yes   • Alcohol/week: 4.0 standard drinks of alcohol   • Types: 4 Cans of beer per week    Comment: usually has a drink in the summer when mowing       Fall Risk Screen  STEADI Fall Risk Assessment was completed, and patient is at LOW risk for falls.Assessment completed on:2025    Depression Screening   Little interest or pleasure in doing things? Not at all   Feeling down, depressed, or hopeless? Not at all   PHQ-2 Total Score 0      Health Habits and Functional and Cognitive Screenin/28/2025     8:11 AM   Functional & Cognitive Status   Do you have difficulty preparing food and eating? No   Do you have difficulty bathing yourself, getting dressed or grooming yourself? No   Do you have difficulty using the toilet? No   Do you have difficulty moving around from place to place? No   Do you have trouble with steps or getting out of a bed or a chair? Yes   Current Diet Well Balanced Diet   Dental Exam Not up to date   Eye Exam Up to date   Exercise (times per week) 4 times per week   Current Exercises Include Walking   Do you need help using the phone?  No   Are you deaf or do you have serious difficulty hearing?  Yes   Do you need help to go to places out of walking distance? No   Do you need help shopping? No   Do you need help preparing  meals?  No   Do you need help with housework?  No   Do you need help with laundry? No   Do you need help taking your medications? No   Do you need help managing money? No   Do you ever drive or ride in a car without wearing a seat belt? No   Have you felt unusual stress, anger or loneliness in the last month? Yes   Who do you live with? Spouse   If you need help, do you have trouble finding someone available to you? No   Have you been bothered in the last four weeks by sexual problems? No   Do you have difficulty concentrating, remembering or making decisions? Yes           Age-appropriate Screening Schedule:  Refer to the list below for future screening recommendations based on patient's age, sex and/or medical conditions. Orders for these recommended tests are listed in the plan section. The patient has been provided with a written plan.    Health Maintenance List  Health Maintenance   Topic Date Due   • ZOSTER VACCINE (1 of 2) Never done   • COLORECTAL CANCER SCREENING  10/01/2024   • COVID-19 Vaccine (4 - 2024-25 season) 11/18/2025 (Originally 9/1/2024)   • TDAP/TD VACCINES (1 - Tdap) 01/10/2026 (Originally 6/24/1974)   • INFLUENZA VACCINE  07/01/2025   • LIPID PANEL  09/12/2025   • LUNG CANCER SCREENING  03/25/2026   • ANNUAL WELLNESS VISIT  05/28/2026   • HEPATITIS C SCREENING  Completed   • Pneumococcal Vaccine 50+  Completed   • AAA SCREEN ONCE  Completed                                                                                                                                                CMS Preventative Services Quick Reference  Risk Factors Identified During Encounter    Alcohol Misuse: Patient encouraged to limit alcohol use to no more than 1 standard alcoholic beverage per day. (12 ounce beer, 6 ounce wine, one shot liquor)  Depression/Dysphoria:  Denies depression  Hearing Problem:  wears hearing aids  Immunizations Discussed/Encouraged: Shingrix  Inactivity/Sedentary: Patient was advised to  "exercise at least 150 minutes a week per CDC recommendations.  Dental Screening Recommended  Vision Screening Recommended    The above risks/problems have been discussed with the patient.    Pertinent information has been shared with the patient in the After Visit Summary.    An After Visit Summary and PPPS were made available to the patient.    Follow Up:     Next Medicare Wellness visit to be scheduled in 1 year.          Additional E&M Note during same encounter follows:  Patient has multiple medical problems which are significant and separately identifiable that require additional work above and beyond the Medicare Wellness Visit.      Chief Complaint  Medicare Wellness-subsequent    Guillaume Rice is a 69 y.o. male who presents to [facility]     History of Present Illness  The patient is a 69-year-old male who presents to the office today for a Medicare annual wellness visit, as well as follow-up on chronic health conditions and medication refills.    He has completed his cardiac workup with Dr. Watkins, which revealed some blood vessel restrictions. An abnormal stress test led to a recommendation for another heart catheterization, as it was suspected that the stents placed 3 years ago may have occluded. He was informed that this is not an urgent matter, but he should seek immediate medical attention if he experiences any chest pain. He has a scheduled appointment with his cardiology NP in 10/2025.    He reports occasional atrial fibrillation, which is detected by his blood pressure machine, although he does not feel it. He is currently on baby aspirin and was previously on anticoagulation therapy, but it was discontinued due to \"excessive slowing of his heart rate\". He reports stable chest pain, which he attributes to heartburn. He has been taking his heartburn medication at night, as advised by his cardiology nurse practitioner, but has recently switched to morning " "administration, resulting in significant improvement in his heartburn symptoms.    He continues to take two medications for his prostate and reports no urinary issues, although he occasionally experiences difficulty urinating. He does not need to urinate during the night. Last PSA in 09/2024 was normal.     He is due for a colonoscopy and plans to have it performed by Dr. Mayen at [facility].     He reports feeling much better physically since the placement of his last stent. His mental health remains unchanged. He has not been hospitalized overnight since his heart attack 3 years ago, which required a week-long stay in intensive care. He has an advanced directive and a living will in place. He quit smoking in 2012 and only consumes alcohol during the summer. He uses a hearing aid during hunting season due to difficulty hearing high-pitched sounds. He had blood work done a few weeks ago, primarily for infection screening. He has been diagnosed with eczema on his legs, which is improving with the warmer weather. He was diagnosed with melanoma 2 to 3 years ago and has an appointment scheduled for 07/2025.    He has not yet undergone the liver protocol abdominal MRI due to financial constraints - recently detected on PET/CT. A spot on his liver was identified 15 years ago during a scan for kidney stones, but he was reassured that it was not a cause for concern. He wonders if this is the same thing.    SOCIAL HISTORY  The patient quit smoking in 2012. He drinks a can of beer during the summer while mowing but does not drink otherwise.    Objective   Vital Signs:   Vitals:    05/28/25 0809   BP: 112/70   Temp: 98.4 °F (36.9 °C)   SpO2: 94%   Weight: 75.3 kg (166 lb)   Height: 167.6 cm (66\")   PainSc: 7    PainLoc: Back       Wt Readings from Last 3 Encounters:   05/28/25 75.3 kg (166 lb)   04/28/25 74.9 kg (165 lb 1.6 oz)   03/25/25 75.3 kg (166 lb)     BP Readings from Last 3 Encounters:   05/28/25 112/70   04/28/25 " 130/70   03/25/25 112/58       Physical Exam  Vitals reviewed.   Constitutional:       General: He is not in acute distress.     Appearance: He is well-developed and overweight. He is not ill-appearing.   HENT:      Head: Normocephalic and atraumatic.   Eyes:      General: No scleral icterus.        Right eye: No discharge.         Left eye: No discharge.      Extraocular Movements: Extraocular movements intact.      Conjunctiva/sclera: Conjunctivae normal.   Neck:      Thyroid: No thyromegaly.      Vascular: No carotid bruit.      Trachea: Trachea normal.   Cardiovascular:      Rate and Rhythm: Normal rate and regular rhythm.      Pulses: Normal pulses.      Heart sounds: No murmur heard.  Pulmonary:      Effort: Pulmonary effort is normal.      Breath sounds: Normal breath sounds. No wheezing, rhonchi or rales.   Musculoskeletal:         General: Normal range of motion.      Cervical back: Normal range of motion and neck supple. No tenderness.      Right lower leg: No edema.      Left lower leg: No edema.   Lymphadenopathy:      Cervical: No cervical adenopathy.   Skin:     General: Skin is warm and dry.   Neurological:      Mental Status: He is alert and oriented to person, place, and time.   Psychiatric:         Mood and Affect: Mood and affect normal.         Behavior: Behavior normal.         Thought Content: Thought content normal.         Judgment: Judgment normal.           The following data was reviewed by LUZ MARINA Leone on 05/28/2025:      Common labs          1/10/2025    08:40 2/10/2025    12:14 4/28/2025    09:50   Common Labs   Glucose 97   102    BUN 17   16    Creatinine 1.17   1.14    Sodium 140   140    Potassium 3.5   4.1    Chloride 102   102    Calcium 9.4   9.8    Albumin 4.1   4.2    Total Bilirubin 0.5   0.7    Alkaline Phosphatase 72   79    AST (SGOT) 20   20    ALT (SGPT) 28   23    WBC 11.21  9.15  9.32    Hemoglobin 16.6  17.6  17.2    Hematocrit 48.4  51.2  51.7     Platelets 316  311  307      Lipid Panel          9/12/2024    11:29   Lipid Panel   Total Cholesterol 127    Triglycerides 91    HDL Cholesterol 41    VLDL Cholesterol 17    LDL Cholesterol  69    LDL/HDL Ratio 1.65      TSH          9/12/2024    11:29   TSH   TSH 3.610      PSA          9/12/2024    11:29   PSA   PSA 1.490      NM PET/CT Skull Base to Mid Thigh  Result Date: 4/14/2025  1. Right lower lobe 7 mm and 4 mm solid nodules are too small for accurate FDG PET/CT characterization. When remeasured, nodules are stable in size from September 2024 and are stable to minimally enlarged from May 2022. Consider conservative follow-up chest CT in 6 months. Background advanced emphysema. 2. A mildly hypermetabolic 1.7 cm hypodense right hepatic lesion is concerning and needs further characterization with liver protocol abdominal MRI (preferable) or CT with IV contrast.   This report was finalized on 4/14/2025 9:16 AM by Dr. Laz Wade M.D on Workstation: [id number]      CT Chest Low Dose Follow Up Without Contrast  Result Date: 3/27/2025  Impression: Enlarging nodules in the right lower lobe measuring 7 mm and 4 mm. Recommend a PET/CT scan for further evaluation. Lung RADS 4X Electronically Signed: Seth Leiva MD  3/27/2025 3:01 PM EDT  Workstation ID: [id number]    MRI Brain Wo Contrast (04/16/2025 07:51)   US Renal Limited (10/11/2024 10:19)       Assessment & Plan   Diagnoses and all orders for this visit:    1. Medicare annual wellness visit, subsequent (Primary)    2. Colon cancer screening  -     Ambulatory Referral For Screening Colonoscopy    3. Essential hypertension  -     hydroCHLOROthiazide 12.5 MG tablet; Take 1 tablet by mouth Every Morning.  Dispense: 90 tablet; Refill: 1  -     CBC Auto Differential  -     Comprehensive Metabolic Panel  -     Lipid Panel  -     TSH+Free T4  -     Microalbumin / Creatinine Urine Ratio - Urine, Clean Catch    4. Coronary artery disease involving native  coronary artery of native heart with unstable angina pectoris    5. Ischemic cardiomyopathy    6. TIA (transient ischemic attack)    7. Mixed hyperlipidemia  -     atorvastatin (LIPITOR) 40 MG tablet; Take 1 tablet by mouth Daily for 180 days.  Dispense: 90 tablet; Refill: 1  -     Comprehensive Metabolic Panel  -     Lipid Panel    8. Gastroesophageal reflux disease without esophagitis  -     pantoprazole (PROTONIX) 40 MG EC tablet; Take 1 tablet by mouth Daily.  Dispense: 90 tablet; Refill: 1    9. BPH associated with nocturia  -     finasteride (PROSCAR) 5 MG tablet; Take 1 tablet by mouth Daily.  Dispense: 90 tablet; Refill: 1  -     tamsulosin (FLOMAX) 0.4 MG capsule 24 hr capsule; Take 1 capsule by mouth Daily.  Dispense: 90 capsule; Refill: 1    10. Liver lesion, right lobe  -     Cancel: MRI Abdomen With & Without Contrast; Future  -     MRI Abdomen With & Without Contrast; Future        Assessment & Plan  1. Medicare annual wellness visit.  - History of hypertension, hyperlipidemia, symptomatic reflux, coronary artery disease with prior PCI of LAD and RCA, ischemic cardiomyopathy with an EF of 45%, and continues to experience symptoms of chest pressure and pain on minimal exertion, shortness of breath.  - Recent brain MRI showed evidence of small vessel disease, carotid duplex revealed moderate nonobstructive plaque on both sides, peripheral vascular disease testing with ABIs and duplex showed no evidence of obstructive disease, recent stress test indicated a perfusion defect on MPI stress anteriorly.  - Deferred invasive evaluation with coronary angiography at this time and will continue medical therapy with aspirin, statin, Plavix, HCTZ, and follow up in 6 months or sooner if any more problems arise.   - Repeat CT of the chest is due in October 2025. Recent PET scan from 04/11/2025 showed right lower lobe 7 mm and 4 mm solid nodules that are too small for accurate FDG PET CT characterization, stable in  size from 09/2024 and stable to minimally enlarged from 05/2022. Mild hypermetabolic 1.7 cm hypodense right hepatic lesion needs further characterization with a liver protocol abdominal MRI.  - Due for a colonoscopy and plans to have it performed by Dr. Mayen at [facility]. Reports feeling much better physically since the placement of his last stent. Mental health remains unchanged. Not hospitalized overnight since heart attack 3 years ago. Advanced directive and living will in place. Quit smoking in 2012 and only consumes alcohol during the summer. Uses a hearing aid during hunting season. Blood work done a few weeks ago, primarily for infection screening. Diagnosed with eczema on legs, improving with warmer weather. Diagnosed with melanoma 2 to 3 years ago, appointment scheduled for 07/2025.    2. Hypertension.  - Blood pressure is being monitored.  - Currently on HCTZ.  - Blood pressure control appears stable.  - Continue current medication regimen.    3. Hyperlipidemia.  - Lipid levels are being monitored.  - Currently on a statin.  - Lipid control appears stable.  - Continue current medication regimen.    4. Coronary artery disease.  - History of prior PCI of LAD and RCA, continues to experience symptoms of chest pressure and pain on minimal exertion, shortness of breath.  - Recent stress test indicated a perfusion defect on MPI stress anteriorly.  - Deferred invasive evaluation with coronary angiography at this time.  - Continue medical therapy with aspirin, statin, Plavix, HCTZ, and follow up in 6 months or sooner if any more problems arise.    5. Ischemic cardiomyopathy.  - EF of 45%, continues to experience symptoms of chest pressure and pain on minimal exertion, shortness of breath.  - Symptoms are being monitored.  - Continue current medication regimen.  - Follow up as needed.    6. Small vessel disease with recent history of TIA.  - Recent brain MRI showed evidence of small vessel disease.  - Symptoms  are being monitored.  - No new treatment required at this time.  - Follow up as needed.    7. Nonobstructive carotid artery disease.  - Carotid duplex revealed moderate nonobstructive plaque on both sides.  - Symptoms are being monitored.  - No new treatment required at this time.  - Follow up as needed.    8. Peripheral vascular disease.  - Peripheral vascular disease testing with ABIs and duplex showed no evidence of obstructive disease.  - Symptoms are being monitored.  - No new treatment required at this time.  - Follow up as needed.    9. Pulmonary nodules.  - Recent PET scan from 04/11/2025 showed right lower lobe 7 mm and 4 mm solid nodules that are too small for accurate FDG PET CT characterization, stable in size from 09/2024 and stable to minimally enlarged from 05/2022.  - Nodules are being monitored.  - No new treatment required at this time.  - Follow up with repeat CT of the chest in October 2025.    10. Hypermetabolic right hepatic lesion.  - Mild hypermetabolic 1.7 cm hypodense right hepatic lesion needs further characterization.  - Lesion is being monitored.  - Order liver protocol abdominal MRI w/wo contrast.  - Follow up as needed.    11. Gastroesophageal reflux disease.  - Reports stable chest pain, attributed to heartburn.  - Recently switched heartburn medication to morning administration, resulting in significant improvement in symptoms.  - Symptoms are being monitored.  - Continue current medication regimen with morning administration.    12. Atrial fibrillation.  - Reports occasional atrial fibrillation, detected by blood pressure machine, does not feel it.  - Currently on baby aspirin, previously on anticoagulation therapy but discontinued due to excessive slowing of heart rate.  - Symptoms are being monitored by cardiology - no mention of atrial fibrillation in last consult note.  - Continue current medication regimen.    13. Benign prostatic hyperplasia.  - Continues to take two  medications for prostate, reports no urinary issues, occasionally experiences difficulty urinating, does not need to urinate during the night.  - Symptoms are being monitored.  - Continue current medication regimen.  - Follow up as needed.               FOLLOW UP  Return in about 6 months (around 11/28/2025) for Next scheduled follow up, medication refills and fasting labs.    Patient was given instructions and counseling regarding his condition or for health maintenance advice. Please see specific information pulled into the AVS if appropriate.     Patient or patient representative verbalized consent for the use of Ambient Listening during the visit with  LUZ MARINA Leone for chart documentation. 5/28/2025  08:43 EDT    LUZ MARINA Leone  05/28/25  08:57 EDT

## 2025-05-29 ENCOUNTER — RESULTS FOLLOW-UP (OUTPATIENT)
Dept: FAMILY MEDICINE CLINIC | Facility: CLINIC | Age: 70
End: 2025-05-29
Payer: MEDICARE

## 2025-05-29 DIAGNOSIS — R94.6 ABNORMAL RESULTS OF THYROID FUNCTION STUDIES: ICD-10-CM

## 2025-05-29 DIAGNOSIS — R79.89 ABNORMAL THYROID STIMULATING HORMONE (TSH) LEVEL: Primary | ICD-10-CM

## 2025-06-14 NOTE — PROGRESS NOTES
Chief Complaint  Rash (Rash on left leg x two moonths )    Subjective      Guillaume Rice is a 69 y.o. male who presents to [facility]     Rash on left leg. Been there about 2 months. Not itchy nor painful. He was given a ketoconazole cream for it by his home health NP but that make it burn and hurt so he stopped after about 10 days. He also uses a lotion for dry skin which helps the dry skin a lot.     Needs refills of some meds. On plavix for CAD. On pantoprazole for GERD. On atorvastatin for HLD.    Objective   Vital Signs:   Vitals:    03/06/25 1449   BP: 114/67   Pulse: 79   Temp: 98 °F (36.7 °C)   SpO2: 94%   Weight: 73.5 kg (162 lb)     Body mass index is 26.15 kg/m².    Wt Readings from Last 3 Encounters:   03/06/25 73.5 kg (162 lb)   01/10/25 74.8 kg (165 lb)   11/18/24 72.6 kg (160 lb)     BP Readings from Last 3 Encounters:   03/06/25 114/67   01/10/25 130/78   11/18/24 124/76       Health Maintenance   Topic Date Due    ZOSTER VACCINE (1 of 2) Never done    COLORECTAL CANCER SCREENING  10/01/2024    ANNUAL WELLNESS VISIT  05/09/2025    COVID-19 Vaccine (4 - 2024-25 season) 11/18/2025 (Originally 9/1/2024)    TDAP/TD VACCINES (1 - Tdap) 01/10/2026 (Originally 6/24/1974)    LIPID PANEL  09/12/2025    LUNG CANCER SCREENING  09/23/2025    BMI FOLLOWUP  03/06/2026    HEPATITIS C SCREENING  Completed    INFLUENZA VACCINE  Completed    Pneumococcal Vaccine 50+  Completed    AAA SCREEN ONCE  Completed       Physical Exam  Vitals and nursing note reviewed.   Constitutional:       General: He is not in acute distress.  Cardiovascular:      Rate and Rhythm: Normal rate and regular rhythm.      Heart sounds: Normal heart sounds.   Pulmonary:      Effort: Pulmonary effort is normal. No respiratory distress.      Breath sounds: Normal breath sounds.   Skin:     Comments: Rash looks very fungal, tinea like on left leg. A few clear slightly rough flat nontender circular rashes. No  Requested Prescriptions     Pending Prescriptions Disp Refills   • losartan 100 MG Oral Tab 90 tablet 2     Sig: Take 1 tablet (100 mg total) by mouth daily.        [address] [place] [address], [place] - [address] redness.   Neurological:      Mental Status: He is alert.   Psychiatric:         Mood and Affect: Mood normal.         Behavior: Behavior normal.          Result Review :  The following data was reviewed by: Bernardo Ritter MD on 03/06/2025:         Procedures          Assessment & Plan  Tinea  Rash looks to be ringworm/tinea. Ketoconazole caused his skin to hurt. Will try terbinafine cream. If no improvement, consider trial of low potency steroid cream  Orders:    terbinafine (lamISIL) 1 % cream; Apply 1 Application topically to the appropriate area as directed 2 (Two) Times a Day.    Mixed hyperlipidemia   Refilled statin    Orders:    atorvastatin (LIPITOR) 40 MG tablet; Take 1 tablet by mouth Daily for 180 days.    Coronary artery disease involving native coronary artery of native heart with unstable angina pectoris  Refilled plavix    Orders:    clopidogrel (PLAVIX) 75 MG tablet; Take 1 tablet by mouth Daily for 180 days.    Gastroesophageal reflux disease without esophagitis  Refilled PPI  Orders:    pantoprazole (PROTONIX) 40 MG EC tablet; Take 1 tablet by mouth Daily for 180 days.    Shortness of breath  Refilled albuterol, he takes this rarely no more than twice a month  Orders:    albuterol sulfate  (90 Base) MCG/ACT inhaler; Inhale 2 puffs Every 6 (Six) Hours As Needed for Wheezing or Shortness of Air.               FOLLOW UP  Return if symptoms worsen or fail to improve.  Patient was given instructions and counseling regarding his condition or for health maintenance advice. Please see specific information pulled into the AVS if appropriate.       Bernardo Ritter MD  03/06/25  15:14 EST    CURRENT & DISCONTINUED MEDICATIONS  Current Outpatient Medications   Medication Instructions    albuterol sulfate  (90 Base) MCG/ACT inhaler 2 puffs, Inhalation, Every 6 Hours PRN    Aspirin Low Dose 81 MG EC tablet TAKE 1 TABLET BY MOUTH EVERY DAY THIS MEDICATION HAS BEEN SHORT FILLED TO LINE UP  WITH YOUR OTHER MEDICATIONS    atorvastatin (LIPITOR) 40 mg, Oral, Daily    clopidogrel (PLAVIX) 75 mg, Oral, Daily    finasteride (PROSCAR) 5 mg, Oral, Daily    hydroCHLOROthiazide 12.5 mg, Oral, Every Morning    pantoprazole (PROTONIX) 40 mg, Oral, Daily    tamsulosin (FLOMAX) 0.4 mg, Oral, Daily    terbinafine (lamISIL) 1 % cream 1 Application, Topical, 2 Times Daily       Medications Discontinued During This Encounter   Medication Reason    atorvastatin (LIPITOR) 40 MG tablet Reorder    pantoprazole (PROTONIX) 40 MG EC tablet Reorder    clopidogrel (PLAVIX) 75 MG tablet Reorder           14-Jun-2025 11:00

## 2025-07-09 DIAGNOSIS — E27.9 ADRENAL DISORDER: ICD-10-CM

## 2025-07-09 DIAGNOSIS — R93.2 ABNORMAL MRI, LIVER: ICD-10-CM

## 2025-07-09 DIAGNOSIS — K76.9 LIVER LESION, RIGHT LOBE: Primary | ICD-10-CM

## 2025-07-14 ENCOUNTER — CLINICAL SUPPORT (OUTPATIENT)
Dept: FAMILY MEDICINE CLINIC | Facility: CLINIC | Age: 70
End: 2025-07-14
Payer: MEDICARE

## 2025-07-14 DIAGNOSIS — R79.89 ABNORMAL THYROID STIMULATING HORMONE (TSH) LEVEL: ICD-10-CM

## 2025-07-14 DIAGNOSIS — R94.6 ABNORMAL RESULTS OF THYROID FUNCTION STUDIES: ICD-10-CM

## 2025-07-14 LAB — TSH SERPL DL<=0.05 MIU/L-ACNC: 5.27 UIU/ML (ref 0.27–4.2)

## 2025-07-14 PROCEDURE — 84443 ASSAY THYROID STIM HORMONE: CPT | Performed by: NURSE PRACTITIONER

## 2025-07-14 PROCEDURE — 36415 COLL VENOUS BLD VENIPUNCTURE: CPT | Performed by: NURSE PRACTITIONER

## 2025-07-14 NOTE — PROGRESS NOTES
..  Venipuncture Blood Specimen Collection  Venipuncture performed in LT arm by Candace Arias with good hemostasis. Patient tolerated the procedure well without complications.   07/14/25   Nelli Monge MA